# Patient Record
Sex: FEMALE | Race: BLACK OR AFRICAN AMERICAN | NOT HISPANIC OR LATINO | Employment: FULL TIME | URBAN - METROPOLITAN AREA
[De-identification: names, ages, dates, MRNs, and addresses within clinical notes are randomized per-mention and may not be internally consistent; named-entity substitution may affect disease eponyms.]

---

## 2017-12-07 RX ORDER — ALBUTEROL SULFATE 90 UG/1
2 AEROSOL, METERED RESPIRATORY (INHALATION) EVERY 6 HOURS
COMMUNITY
End: 2018-11-16

## 2017-12-07 RX ORDER — EPINEPHRINE 0.3 MG/.3ML
0.3 INJECTION SUBCUTANEOUS PRN
COMMUNITY
End: 2018-11-16

## 2017-12-08 ENCOUNTER — ANESTHESIA (OUTPATIENT)
Dept: SURGERY | Facility: CLINIC | Age: 27
End: 2017-12-08
Payer: COMMERCIAL

## 2017-12-08 ENCOUNTER — SURGERY (OUTPATIENT)
Age: 27
End: 2017-12-08

## 2017-12-08 ENCOUNTER — ANESTHESIA EVENT (OUTPATIENT)
Dept: SURGERY | Facility: CLINIC | Age: 27
End: 2017-12-08
Payer: COMMERCIAL

## 2017-12-08 ENCOUNTER — HOSPITAL ENCOUNTER (OUTPATIENT)
Facility: CLINIC | Age: 27
Discharge: HOME | End: 2017-12-08
Attending: OPHTHALMOLOGY | Admitting: OPHTHALMOLOGY
Payer: COMMERCIAL

## 2017-12-08 VITALS
DIASTOLIC BLOOD PRESSURE: 93 MMHG | SYSTOLIC BLOOD PRESSURE: 112 MMHG | BODY MASS INDEX: 28.04 KG/M2 | OXYGEN SATURATION: 100 % | HEIGHT: 68 IN | WEIGHT: 185 LBS | RESPIRATION RATE: 16 BRPM | TEMPERATURE: 97 F

## 2017-12-08 PROCEDURE — 25000128 H RX IP 250 OP 636: Performed by: OPHTHALMOLOGY

## 2017-12-08 PROCEDURE — 36000104 ZZH EYE SURGERY LEVEL 4 1ST 30 MIN: Performed by: OPHTHALMOLOGY

## 2017-12-08 PROCEDURE — 37000009 ZZH ANESTHESIA TECHNICAL FEE, EACH ADDTL 15 MIN: Performed by: OPHTHALMOLOGY

## 2017-12-08 PROCEDURE — 25000125 ZZHC RX 250: Performed by: OPHTHALMOLOGY

## 2017-12-08 PROCEDURE — 25000128 H RX IP 250 OP 636: Performed by: ANESTHESIOLOGY

## 2017-12-08 PROCEDURE — 71000005 ZZH RECOVERY EYE PHASE 1 LEVEL 1 EA ADDTL HR: Performed by: OPHTHALMOLOGY

## 2017-12-08 PROCEDURE — 71000028 ZZH EYE RECOVERY PHASE 2 EACH 15 MINS: Performed by: OPHTHALMOLOGY

## 2017-12-08 PROCEDURE — 36000105 ZZH EYE SURGERY LEVEL 4 EA 15 ADDTL MIN: Performed by: OPHTHALMOLOGY

## 2017-12-08 PROCEDURE — 40000170 ZZH STATISTIC PRE-PROCEDURE ASSESSMENT II: Performed by: OPHTHALMOLOGY

## 2017-12-08 PROCEDURE — 37000008 ZZH ANESTHESIA TECHNICAL FEE, 1ST 30 MIN: Performed by: OPHTHALMOLOGY

## 2017-12-08 PROCEDURE — 71000004 ZZH RECOVERY EYE PHASE 1 LEVEL 1 FIRST HR: Performed by: OPHTHALMOLOGY

## 2017-12-08 PROCEDURE — V2630 ANTER CHAMBER INTRAOCUL LENS: HCPCS | Performed by: OPHTHALMOLOGY

## 2017-12-08 PROCEDURE — 27210794 ZZH OR GENERAL SUPPLY STERILE: Performed by: OPHTHALMOLOGY

## 2017-12-08 PROCEDURE — 25000125 ZZHC RX 250: Performed by: NURSE ANESTHETIST, CERTIFIED REGISTERED

## 2017-12-08 PROCEDURE — 25000128 H RX IP 250 OP 636: Performed by: NURSE ANESTHETIST, CERTIFIED REGISTERED

## 2017-12-08 DEVICE — IMPLANTABLE DEVICE: Type: IMPLANTABLE DEVICE | Site: EYE | Status: FUNCTIONAL

## 2017-12-08 RX ORDER — ONDANSETRON 2 MG/ML
4 INJECTION INTRAMUSCULAR; INTRAVENOUS EVERY 30 MIN PRN
Status: DISCONTINUED | OUTPATIENT
Start: 2017-12-08 | End: 2017-12-08 | Stop reason: HOSPADM

## 2017-12-08 RX ORDER — LIDOCAINE 40 MG/G
CREAM TOPICAL
Status: DISCONTINUED | OUTPATIENT
Start: 2017-12-08 | End: 2017-12-08 | Stop reason: HOSPADM

## 2017-12-08 RX ORDER — DEXAMETHASONE SODIUM PHOSPHATE 4 MG/ML
INJECTION, SOLUTION INTRA-ARTICULAR; INTRALESIONAL; INTRAMUSCULAR; INTRAVENOUS; SOFT TISSUE PRN
Status: DISCONTINUED | OUTPATIENT
Start: 2017-12-08 | End: 2017-12-08

## 2017-12-08 RX ORDER — ONDANSETRON 4 MG/1
4 TABLET, ORALLY DISINTEGRATING ORAL EVERY 30 MIN PRN
Status: DISCONTINUED | OUTPATIENT
Start: 2017-12-08 | End: 2017-12-08 | Stop reason: HOSPADM

## 2017-12-08 RX ORDER — PROPARACAINE HYDROCHLORIDE 5 MG/ML
1 SOLUTION/ DROPS OPHTHALMIC ONCE
Status: COMPLETED | OUTPATIENT
Start: 2017-12-08 | End: 2017-12-08

## 2017-12-08 RX ORDER — TETRACAINE HYDROCHLORIDE 5 MG/ML
SOLUTION OPHTHALMIC PRN
Status: DISCONTINUED | OUTPATIENT
Start: 2017-12-08 | End: 2017-12-08 | Stop reason: HOSPADM

## 2017-12-08 RX ORDER — LABETALOL HYDROCHLORIDE 5 MG/ML
10 INJECTION, SOLUTION INTRAVENOUS
Status: DISCONTINUED | OUTPATIENT
Start: 2017-12-08 | End: 2017-12-08 | Stop reason: HOSPADM

## 2017-12-08 RX ORDER — SODIUM CHLORIDE, SODIUM LACTATE, POTASSIUM CHLORIDE, CALCIUM CHLORIDE 600; 310; 30; 20 MG/100ML; MG/100ML; MG/100ML; MG/100ML
INJECTION, SOLUTION INTRAVENOUS CONTINUOUS
Status: DISCONTINUED | OUTPATIENT
Start: 2017-12-08 | End: 2017-12-08 | Stop reason: HOSPADM

## 2017-12-08 RX ORDER — LIDOCAINE HYDROCHLORIDE 20 MG/ML
INJECTION, SOLUTION INFILTRATION; PERINEURAL PRN
Status: DISCONTINUED | OUTPATIENT
Start: 2017-12-08 | End: 2017-12-08

## 2017-12-08 RX ORDER — ONDANSETRON 2 MG/ML
INJECTION INTRAMUSCULAR; INTRAVENOUS PRN
Status: DISCONTINUED | OUTPATIENT
Start: 2017-12-08 | End: 2017-12-08

## 2017-12-08 RX ORDER — ALBUTEROL SULFATE 0.83 MG/ML
2.5 SOLUTION RESPIRATORY (INHALATION) EVERY 4 HOURS PRN
Status: DISCONTINUED | OUTPATIENT
Start: 2017-12-08 | End: 2017-12-08 | Stop reason: HOSPADM

## 2017-12-08 RX ORDER — PHENYLEPHRINE HYDROCHLORIDE 25 MG/ML
1 SOLUTION/ DROPS OPHTHALMIC
Status: COMPLETED | OUTPATIENT
Start: 2017-12-08 | End: 2017-12-08

## 2017-12-08 RX ORDER — HYDRALAZINE HYDROCHLORIDE 20 MG/ML
2.5-5 INJECTION INTRAMUSCULAR; INTRAVENOUS EVERY 10 MIN PRN
Status: DISCONTINUED | OUTPATIENT
Start: 2017-12-08 | End: 2017-12-08 | Stop reason: HOSPADM

## 2017-12-08 RX ORDER — FENTANYL CITRATE 50 UG/ML
25-50 INJECTION, SOLUTION INTRAMUSCULAR; INTRAVENOUS
Status: DISCONTINUED | OUTPATIENT
Start: 2017-12-08 | End: 2017-12-08 | Stop reason: HOSPADM

## 2017-12-08 RX ORDER — HYDROMORPHONE HYDROCHLORIDE 1 MG/ML
.3-.5 INJECTION, SOLUTION INTRAMUSCULAR; INTRAVENOUS; SUBCUTANEOUS EVERY 10 MIN PRN
Status: DISCONTINUED | OUTPATIENT
Start: 2017-12-08 | End: 2017-12-08 | Stop reason: HOSPADM

## 2017-12-08 RX ORDER — NALOXONE HYDROCHLORIDE 0.4 MG/ML
.1-.4 INJECTION, SOLUTION INTRAMUSCULAR; INTRAVENOUS; SUBCUTANEOUS
Status: DISCONTINUED | OUTPATIENT
Start: 2017-12-08 | End: 2017-12-08 | Stop reason: HOSPADM

## 2017-12-08 RX ORDER — TROPICAMIDE 10 MG/ML
1 SOLUTION/ DROPS OPHTHALMIC
Status: COMPLETED | OUTPATIENT
Start: 2017-12-08 | End: 2017-12-08

## 2017-12-08 RX ORDER — MEPERIDINE HYDROCHLORIDE 25 MG/ML
12.5 INJECTION INTRAMUSCULAR; INTRAVENOUS; SUBCUTANEOUS
Status: DISCONTINUED | OUTPATIENT
Start: 2017-12-08 | End: 2017-12-08 | Stop reason: HOSPADM

## 2017-12-08 RX ORDER — PROPOFOL 10 MG/ML
INJECTION, EMULSION INTRAVENOUS PRN
Status: DISCONTINUED | OUTPATIENT
Start: 2017-12-08 | End: 2017-12-08

## 2017-12-08 RX ORDER — PROPOFOL 10 MG/ML
INJECTION, EMULSION INTRAVENOUS CONTINUOUS PRN
Status: DISCONTINUED | OUTPATIENT
Start: 2017-12-08 | End: 2017-12-08

## 2017-12-08 RX ORDER — MOXIFLOXACIN 5 MG/ML
1 SOLUTION/ DROPS OPHTHALMIC
Status: COMPLETED | OUTPATIENT
Start: 2017-12-08 | End: 2017-12-08

## 2017-12-08 RX ORDER — DICLOFENAC SODIUM 1 MG/ML
1 SOLUTION/ DROPS OPHTHALMIC
Status: COMPLETED | OUTPATIENT
Start: 2017-12-08 | End: 2017-12-08

## 2017-12-08 RX ORDER — LIDOCAINE HYDROCHLORIDE 10 MG/ML
INJECTION, SOLUTION EPIDURAL; INFILTRATION; INTRACAUDAL; PERINEURAL PRN
Status: DISCONTINUED | OUTPATIENT
Start: 2017-12-08 | End: 2017-12-08 | Stop reason: HOSPADM

## 2017-12-08 RX ORDER — FENTANYL CITRATE 50 UG/ML
INJECTION, SOLUTION INTRAMUSCULAR; INTRAVENOUS PRN
Status: DISCONTINUED | OUTPATIENT
Start: 2017-12-08 | End: 2017-12-08

## 2017-12-08 RX ORDER — BALANCED SALT SOLUTION 6.4; .75; .48; .3; 3.9; 1.7 MG/ML; MG/ML; MG/ML; MG/ML; MG/ML; MG/ML
SOLUTION OPHTHALMIC PRN
Status: DISCONTINUED | OUTPATIENT
Start: 2017-12-08 | End: 2017-12-08 | Stop reason: HOSPADM

## 2017-12-08 RX ADMIN — LIDOCAINE HYDROCHLORIDE 1 ML: 10 INJECTION, SOLUTION EPIDURAL; INFILTRATION; INTRACAUDAL; PERINEURAL at 14:07

## 2017-12-08 RX ADMIN — FENTANYL CITRATE 50 MCG: 50 INJECTION, SOLUTION INTRAMUSCULAR; INTRAVENOUS at 13:33

## 2017-12-08 RX ADMIN — DEXMEDETOMIDINE HYDROCHLORIDE 12 MCG: 100 INJECTION, SOLUTION INTRAVENOUS at 14:25

## 2017-12-08 RX ADMIN — FENTANYL CITRATE 25 MCG: 50 INJECTION, SOLUTION INTRAMUSCULAR; INTRAVENOUS at 14:03

## 2017-12-08 RX ADMIN — MIDAZOLAM 2 MG: 1 INJECTION INTRAMUSCULAR; INTRAVENOUS at 13:30

## 2017-12-08 RX ADMIN — PROPOFOL 200 MCG/KG/MIN: 10 INJECTION, EMULSION INTRAVENOUS at 13:34

## 2017-12-08 RX ADMIN — LIDOCAINE HYDROCHLORIDE 80 MG: 20 INJECTION, SOLUTION INFILTRATION; PERINEURAL at 13:34

## 2017-12-08 RX ADMIN — PROPARACAINE HYDROCHLORIDE 1 DROP: 5 SOLUTION/ DROPS OPHTHALMIC at 12:39

## 2017-12-08 RX ADMIN — PROPOFOL: 10 INJECTION, EMULSION INTRAVENOUS at 14:09

## 2017-12-08 RX ADMIN — TROPICAMIDE 1 DROP: 10 SOLUTION/ DROPS OPHTHALMIC at 12:39

## 2017-12-08 RX ADMIN — SODIUM CHLORIDE, POTASSIUM CHLORIDE, SODIUM LACTATE AND CALCIUM CHLORIDE: 600; 310; 30; 20 INJECTION, SOLUTION INTRAVENOUS at 13:00

## 2017-12-08 RX ADMIN — DEXAMETHASONE SODIUM PHOSPHATE 4 MG: 4 INJECTION, SOLUTION INTRA-ARTICULAR; INTRALESIONAL; INTRAMUSCULAR; INTRAVENOUS; SOFT TISSUE at 13:47

## 2017-12-08 RX ADMIN — TROPICAMIDE 1 DROP: 10 SOLUTION/ DROPS OPHTHALMIC at 12:50

## 2017-12-08 RX ADMIN — CARBACHOL 0.5 ML: 0.1 SOLUTION OPHTHALMIC at 14:09

## 2017-12-08 RX ADMIN — PROPOFOL 200 MG: 10 INJECTION, EMULSION INTRAVENOUS at 13:34

## 2017-12-08 RX ADMIN — DEXMEDETOMIDINE HYDROCHLORIDE 8 MCG: 100 INJECTION, SOLUTION INTRAVENOUS at 14:31

## 2017-12-08 RX ADMIN — ONDANSETRON 4 MG: 2 INJECTION INTRAMUSCULAR; INTRAVENOUS at 14:24

## 2017-12-08 RX ADMIN — ACETYLCHOLINE CHLORIDE 5 MG: KIT at 14:12

## 2017-12-08 RX ADMIN — SODIUM CHLORIDE, POTASSIUM CHLORIDE, SODIUM LACTATE AND CALCIUM CHLORIDE: 600; 310; 30; 20 INJECTION, SOLUTION INTRAVENOUS at 14:33

## 2017-12-08 RX ADMIN — PHENYLEPHRINE HYDROCHLORIDE 1 DROP: 2.5 SOLUTION/ DROPS OPHTHALMIC at 12:50

## 2017-12-08 RX ADMIN — EPINEPHRINE 500 ML: 1 INJECTION, SOLUTION, CONCENTRATE INTRAVENOUS at 14:07

## 2017-12-08 RX ADMIN — DICLOFENAC SODIUM 1 DROP: 1 SOLUTION OPHTHALMIC at 13:00

## 2017-12-08 RX ADMIN — ACETYLCHOLINE CHLORIDE 5 MG: KIT at 14:06

## 2017-12-08 RX ADMIN — DICLOFENAC SODIUM 1 DROP: 1 SOLUTION OPHTHALMIC at 12:50

## 2017-12-08 RX ADMIN — DICLOFENAC SODIUM 1 DROP: 1 SOLUTION OPHTHALMIC at 12:39

## 2017-12-08 RX ADMIN — MOXIFLOXACIN 1 DROP: 5 SOLUTION/ DROPS OPHTHALMIC at 12:39

## 2017-12-08 RX ADMIN — BALANCED SALT SOLUTION 15 ML: 6.4; .75; .48; .3; 3.9; 1.7 SOLUTION OPHTHALMIC at 14:06

## 2017-12-08 RX ADMIN — TROPICAMIDE 1 DROP: 10 SOLUTION/ DROPS OPHTHALMIC at 12:59

## 2017-12-08 RX ADMIN — FENTANYL CITRATE 25 MCG: 50 INJECTION, SOLUTION INTRAMUSCULAR; INTRAVENOUS at 14:18

## 2017-12-08 RX ADMIN — PROPOFOL: 10 INJECTION, EMULSION INTRAVENOUS at 14:27

## 2017-12-08 RX ADMIN — Medication 1 ML: at 14:08

## 2017-12-08 RX ADMIN — Medication 0.8 ML: at 14:15

## 2017-12-08 RX ADMIN — PHENYLEPHRINE HYDROCHLORIDE 1 DROP: 2.5 SOLUTION/ DROPS OPHTHALMIC at 12:39

## 2017-12-08 RX ADMIN — PHENYLEPHRINE HYDROCHLORIDE 1 DROP: 2.5 SOLUTION/ DROPS OPHTHALMIC at 13:00

## 2017-12-08 RX ADMIN — TETRACAINE HYDROCHLORIDE 2 DROP: 5 SOLUTION OPHTHALMIC at 14:08

## 2017-12-08 RX ADMIN — MOXIFLOXACIN 1 DROP: 5 SOLUTION/ DROPS OPHTHALMIC at 12:59

## 2017-12-08 RX ADMIN — MOXIFLOXACIN 1 DROP: 5 SOLUTION/ DROPS OPHTHALMIC at 12:50

## 2017-12-08 ASSESSMENT — COPD QUESTIONNAIRES: COPD: 0

## 2017-12-08 ASSESSMENT — ENCOUNTER SYMPTOMS
SEIZURES: 0
DYSRHYTHMIAS: 0

## 2017-12-08 NOTE — OR NURSING
Pt arrived in Phase II, took off her BP cuff and wanted to get up to BR immediately. Assisted to BR. Began getting dressed when getting back to bay 10. Boyfriend, their children, and grandmother came to Phase II. Discharge instructions reviewed and pt has her drops. Denies pain.

## 2017-12-08 NOTE — ANESTHESIA CARE TRANSFER NOTE
Patient: Ernesto Valencia    Procedure(s):  RIGHT EYE  LENSECTOMY, ANTERIOR VITRECTOMY AND ANTERIOR CHAMBER INTRAOCULAR LENS IMPLANT - Wound Class: I-Clean   - Wound Class: I-Clean    Diagnosis: MARFANS SYNDROME WITH SUBLUXATION OF EYE   Diagnosis Additional Information: No value filed.    Anesthesia Type:   General, LMA     Note:  Airway :Face Mask  Patient transferred to:PACU  Handoff Report: Identifed the Patient, Identified the Reponsible Provider, Reviewed the pertinent medical history, Discussed the surgical course, Reviewed Intra-OP anesthesia mangement and issues during anesthesia, Set expectations for post-procedure period and Allowed opportunity for questions and acknowledgement of understanding    At end of procedure, spontaneous respirations, adequate tidal volumes, LMA removed atraumatically, airway patent after LMA removal. Oxygen tubing connected to wall O2 in PACU, SpO2, NiBP, and EKG monitors and alarms on and functioning, report on patient's clinical status given to PACU RN, RN questions answered.Transferred to Main PACU cart with armrests up, spontaneous respirations, O2 saturation maintained greater than 95% with oxygen via facemask.    Electronically Signed By: MARY Macdonald CRNA  December 8, 2017  2:47 PM

## 2017-12-08 NOTE — IP AVS SNAPSHOT
MRN:[id number]                      After Visit Summary   12/8/2017    Ernesto Valencia    MRN: [id number]           Thank you!     Thank you for choosing [facility] for your care. Our goal is always to provide you with excellent care. Hearing back from our patients is one way we can continue to improve our services. Please take a few minutes to complete the written survey that you may receive in the mail after you visit with us. Thank you!        Patient Information     Date Of Birth          1990        About your hospital stay     You were admitted on:  December 8, 2017 You last received care in the:  [facility]    You were discharged on:  December 8, 2017       Who to Call     For medical emergencies, please call 911.  For non-urgent questions about your medical care, please call your primary care provider or clinic, None  For questions related to your surgery, please call your surgery clinic        Attending Provider     Provider Andrew Ibarra MD Ophthalmology       Primary Care Provider Fax #    Physician No Ref-Primary [phone]      Your next 10 appointments already scheduled     Dec 21, 2017   Procedure with Andrew Martinez MD   [facility] (--)    [address] [place] [address] [address], [address]  [facility] [address] [phone]              Further instructions from your care team       Cataract Surgery Postoperative Instructions  Dr. Andrew Martinez      Postoperative Medications: After surgery, you will use eye drop medications. In most cases, you will start these eye drops 2 days before the day of surgery.   Follow the directions provided to you from the pharmacy or from the doctor's office.    The drops might sting a little when they are instilled, and that is normal.    It doesn't matter what order you put the drops into your eyes, but you should wait at least one minute between drops.    Recently we started using a  "compounded drop that contains all three prescriptions in one bottle. If you have this drop you only need to use one drop 4 time per day. Many people choose to do the drops at breakfast, lunch, dinner, and bedtime.    Artificial Tears- Are lubricating drops used to moisturize the eye.  You can use these as much as you want.  Particularly if your eyes feel watery, gritty, or uncomfortable.  Chilling these drops in the refrigerator results in a more soothing feeling.  There are several brands of artificial tears available including, but not limited to, Optive, Refresh, Systane, Blink, Genteal, Soothe, and others.  You should not use drops that are \"gets the red out.\"  You do not need a prescription for these medications.     Please continue any glaucoma, dry eye, or other medications you were using prior to the surgery.      Please allow 24 to 48 hours when requesting refills, and call BEFORE you run out of drops.    Restriction on Activities-  It is extremely important that you DO NOT RUB THE TREATED EYE.    - You will be given a clear plastic shield to wear as protection over your eye the night after surgery.    - Refrain from many activities that may put your eye at risk of injury, as well as areas containing a high volume of chemicals, dust, and debris.    - Do Not wear any eye makeup or moisturizer around the eye for 1 week after surgery.    - Do Not swim or go into a hot-tub, jacuzzi, or sauna for 1 week following surgery.  You can take showers as normal, but avoid getting shampoo or soap into your eyes.     - Avoid strenuous activity, including lifting more than 10 pounds, for 1 week after surgery.       - It is fine to bathe, read, watch TV, and use the computer.    Symptoms requiring medical attention:     - Sudden onset of increased discharge from the eye.  - Persistent or increasing pain in the eye.  - Sudden decrease in vision.  - Persistent nausea or vomiting.      If you have any questions or concerns " "before or after your surgery, please contact Dr. Martinez's office at [phone].         [facility] Discharge  Instructions  Anesthesia (Eye Care Center)   Adult Discharge Instructions (General)    For 24 hours after surgery    1. Get plenty of rest.  Make arrangements to have a responsible adult stay with you for at least 24 hours.  2. Do not drive or use heavy equipment for 24 hours.    3. Do not drink alcohol for 24 hours.  4. Do not sign legal documents or make important decisions for 24 hours.  5. Avoid strenuous or risky activities. You may feel lightheaded.  If so, sit for a few minutes before standing.  Have someone help you get up.   6. General anesthesia patients should drink only clear liquids (apple juice, ginger ale, broth or 7-Up). Be sure to drink enough fluids.  Move to a regular diet as you feel able.  7. Any questions of medical nature, call your physician.          Pending Results     No orders found from 12/6/2017 to 12/9/2017.            Admission Information     Date & Time Provider Department Dept. Phone    12/8/2017 Andrew Martinez MD [facility] [phone]      Your Vitals Were     Blood Pressure Temperature Respirations Height Weight Last Period    112/93 97  F (36.1  C) (Temporal) 16 1.727 m (5' 8\") 83.9 kg (185 lb) 11/17/2017    Pulse Oximetry BMI (Body Mass Index)                100% 28.13 kg/m2          MyChart Information     [employer] lets you send messages to your doctor, view your test results, renew your prescriptions, schedule appointments and more. To sign up, go to [email or web address] . Click on \"Log in\" on the left side of the screen, which will take you to the Welcome page. Then click on \"Sign up Now\" on the right side of the page.     You will be asked to enter the access code listed below, as well as some personal information. Please follow the directions to create your username and password.     Your " access code is: [email or web address]  Expires: 3/8/2018  4:14 PM     Your access code will  in 90 days. If you need help or a new code, please call your [place] clinic or [phone].        Care EveryWhere ID     This is your Care EveryWhere ID. This could be used by other organizations to access your [place] medical records  [id number]        Equal Access to Services     RADHA GRAYSON : Hadii aad ku hadasho Soomaali, waaxda luqadaha, qaybta kaalmada adeegyada, waxzainab miguelin kann adelyric sequeira lafloydelly . So [facility] [phone].    ATENCIÓN: Si habla español, tiene a chahal disposición servicios gratuitos de asistencia lingüística. Pepitoame al [phone].    We comply with applicable federal civil rights laws and [place] laws. We do not discriminate on the basis of race, color, national origin, age, disability, sex, sexual orientation, or gender identity.               Review of your medicines      UNREVIEWED medicines. Ask your doctor about these medicines        Dose / Directions    ACETAMINOPHEN PO        Dose:  325 mg   Take 325 mg by mouth every 4 hours as needed for pain   Refills:  0       albuterol 108 (90 BASE) MCG/ACT Inhaler   Commonly known as:  PROAIR HFA/PROVENTIL HFA/VENTOLIN HFA        Dose:  2 puff   Inhale 2 puffs into the lungs every 6 hours   Refills:  0       EPINEPHrine 0.3 MG/0.3ML injection 2-pack   Commonly known as:  EPIPEN/ADRENACLICK/or ANY BX GENERIC EQUIV        Dose:  0.3 mg   Inject 0.3 mg into the muscle as needed for anaphylaxis   Refills:  0       MOTRIN IB PO        Dose:  600 mg   Take 600 mg by mouth 3 times daily   Refills:  0                Protect others around you: Learn how to safely use, store and throw away your medicines at www.disposemymeds.org.             Medication List: This is a list of all your medications and when to take them. Check marks below indicate your daily home schedule. Keep this list as a reference.      Medications           Morning Afternoon Evening  Bedtime As Needed    ACETAMINOPHEN PO   Take 325 mg by mouth every 4 hours as needed for pain                                albuterol 108 (90 BASE) MCG/ACT Inhaler   Commonly known as:  PROAIR HFA/PROVENTIL HFA/VENTOLIN HFA   Inhale 2 puffs into the lungs every 6 hours                                EPINEPHrine 0.3 MG/0.3ML injection 2-pack   Commonly known as:  EPIPEN/ADRENACLICK/or ANY BX GENERIC EQUIV   Inject 0.3 mg into the muscle as needed for anaphylaxis                                MOTRIN IB PO   Take 600 mg by mouth 3 times daily

## 2017-12-08 NOTE — DISCHARGE INSTRUCTIONS
"Cataract Surgery Postoperative Instructions  Dr. Andrew Martinez      Postoperative Medications: After surgery, you will use eye drop medications. In most cases, you will start these eye drops 2 days before the day of surgery.   Follow the directions provided to you from the pharmacy or from the doctor's office.    The drops might sting a little when they are instilled, and that is normal.    It doesn't matter what order you put the drops into your eyes, but you should wait at least one minute between drops.    Recently we started using a compounded drop that contains all three prescriptions in one bottle. If you have this drop you only need to use one drop 4 time per day. Many people choose to do the drops at breakfast, lunch, dinner, and bedtime.    Artificial Tears- Are lubricating drops used to moisturize the eye.  You can use these as much as you want.  Particularly if your eyes feel watery, gritty, or uncomfortable.  Chilling these drops in the refrigerator results in a more soothing feeling.  There are several brands of artificial tears available including, but not limited to, Optive, Refresh, Systane, Blink, Genteal, Soothe, and others.  You should not use drops that are \"gets the red out.\"  You do not need a prescription for these medications.     Please continue any glaucoma, dry eye, or other medications you were using prior to the surgery.      Please allow 24 to 48 hours when requesting refills, and call BEFORE you run out of drops.    Restriction on Activities-  It is extremely important that you DO NOT RUB THE TREATED EYE.    - You will be given a clear plastic shield to wear as protection over your eye the night after surgery.    - Refrain from many activities that may put your eye at risk of injury, as well as areas containing a high volume of chemicals, dust, and debris.    - Do Not wear any eye makeup or moisturizer around the eye for 1 week after surgery.    - Do Not swim or go into a hot-tub, " jacuzzi, or sauna for 1 week following surgery.  You can take showers as normal, but avoid getting shampoo or soap into your eyes.     - Avoid strenuous activity, including lifting more than 10 pounds, for 1 week after surgery.       - It is fine to bathe, read, watch TV, and use the computer.    Symptoms requiring medical attention:     - Sudden onset of increased discharge from the eye.  - Persistent or increasing pain in the eye.  - Sudden decrease in vision.  - Persistent nausea or vomiting.      If you have any questions or concerns before or after your surgery, please contact Dr. Martinez's office at [phone].         [facility] Anesthesia [facility] Care [facility] Discharge  Instructions  Anesthesia (Eye Care Center)   Adult Discharge Instructions (General)    For 24 hours after surgery    1. Get plenty of rest.  Make arrangements to have a responsible adult stay with you for at least 24 hours.  2. Do not drive or use heavy equipment for 24 hours.    3. Do not drink alcohol for 24 hours.  4. Do not sign legal documents or make important decisions for 24 hours.  5. Avoid strenuous or risky activities. You may feel lightheaded.  If so, sit for a few minutes before standing.  Have someone help you get up.   6. General anesthesia patients should drink only clear liquids (apple juice, ginger ale, broth or 7-Up). Be sure to drink enough fluids.  Move to a regular diet as you feel able.  7. Any questions of medical nature, call your physician.

## 2017-12-08 NOTE — OR NURSING
Telephone report given to [facility] RN.  Patient will be transported to Room. 10 via cart accompanied by ILIA.

## 2017-12-08 NOTE — ANESTHESIA PREPROCEDURE EVALUATION
Procedure: Procedure(s):  VITRECTOMY ANTERIOR WITH INTRAOCULAR LENS IMPLANT  IMPLANT SECONDARY INTRAOCULAR LENS IMPLANT  Preop diagnosis: MARFANS SYNDROME WITH SUBLUXATION OF EYE     Allergies   Allergen Reactions     Seafood      Past Medical History:   Diagnosis Date     Fetal alcohol spectrum disorder      Marfan syndrome      Scoliosis      Subluxation of lens      Uncomplicated asthma      No past surgical history on file.  Prior to Admission medications    Medication Sig Start Date End Date Taking? Authorizing Provider   albuterol (PROAIR HFA/PROVENTIL HFA/VENTOLIN HFA) 108 (90 BASE) MCG/ACT Inhaler Inhale 2 puffs into the lungs every 6 hours   Yes Reported, Patient   ACETAMINOPHEN PO Take 325 mg by mouth every 4 hours as needed for pain   Yes Reported, Patient   MOTRIN IB PO Take 600 mg by mouth 3 times daily   Yes Reported, Patient   EPINEPHrine (EPIPEN/ADRENACLICK/OR ANY BX GENERIC EQUIV) 0.3 MG/0.3ML injection 2-pack Inject 0.3 mg into the muscle as needed for anaphylaxis   Yes Reported, Patient     ECG: NSR, no st or twave abnormalities.     Anesthesia Evaluation     .             ROS/MED HX    ENT/Pulmonary:     (+)asthma , . .   (-) COPD and sleep apnea   Neurologic:      (-) seizures, CVA and migraines   Cardiovascular:        (-) hypertension, CAD, PERALES, arrhythmias and dyslipidemia   METS/Exercise Tolerance:  >4 METS   Hematologic:        (-) history of blood clots, anemia and other hematologic disorder   Musculoskeletal:   (+) , , other musculoskeletal- Scoliosis      GI/Hepatic:        (-) GERD and liver disease   Renal/Genitourinary:      (-) renal disease and nephrolithiasis   Endo:      (-) Type I DM, Type II DM and thyroid disease   Psychiatric:         Infectious Disease:        (-) Recent Fever   Malignancy:         Other: Comment: Fetal alcohol disorder  Marfan syndrome  Subluxation of lens                    Physical Exam  Normal systems: cardiovascular, pulmonary and dental    Airway    Mallampati: II  TM distance: >3 FB  Neck ROM: full    Dental     Cardiovascular   Rhythm and rate: regular and normal  (-) no murmur    Pulmonary    breath sounds clear to auscultation                    Anesthesia Plan      History & Physical Review      ASA Status:  2 .    NPO Status:  > 8 hours    Plan for General and LMA with Propofol induction. Maintenance will be TIVA.    PONV prophylaxis:  Ondansetron (or other 5HT-3)  Propofol infusion      Postoperative Care  Postoperative pain management:  Multi-modal analgesia.      Consents  Anesthetic plan, risks, benefits and alternatives discussed with:  Patient..                          .

## 2017-12-08 NOTE — IP AVS SNAPSHOT
[facility]    [address]    [place] [address]    Phone:  [phone]    Fax:  [phone]                                       After Visit Summary   12/8/2017    Ernesto Valencia    MRN: [id number]           After Visit Summary Signature Page     I have received my discharge instructions, and my questions have been answered. I have discussed any challenges I see with this plan with the nurse or doctor.    ..........................................................................................................................................  Patient/Patient Representative Signature      ..........................................................................................................................................  Patient Representative Print Name and Relationship to Patient    ..................................................               ................................................  Date                                            Time    ..........................................................................................................................................  Reviewed by Signature/Title    ...................................................              ..............................................  Date                                                            Time

## 2017-12-08 NOTE — OP NOTE
SURGEON:  Andrew Martinez MD.     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    POSTOPERATIVE DIAGNOSIS:  Subluxated intraocular lens secondary to Marfan's syndrome.    PROCEDURE: Aspiration of lens material with insertion of an anterior chamber intraocular lens.     INDICATIONS:  Ernesto Valencia is an 19-year-old female with decreased vision and diplopia secondary to lens subluxation as a result of Marfan's syndrome.       ANESTHESIA:  General.     COMPLICATIONS:  None.     PROCEDURE:  Following induction of general anesthesia, the patient's eye was prepped and draped in the usual sterile fashion.  A super sharp blade was used to create a paracentesis, and Viscoat was injected over the area of compromised zonules to keep vitreous posterior.  Additional Viscoat was then used to inflate the anterior chamber.  A 2.8 mm keratome was used to create a clear corneal incision.  Using a cystotome and Utrata forceps, a continuous curved linear capsulorrhexis was performed. Capsule hooks were used to stabilize the capsular bag. Using aspiration, the nuclear lens material was removed from the capsular bag in addition to cortex.  Anterior vitrectomy was performed.  Miochol and Miostat were used to bring the pupil down.  A surgical peripheral iridotomy was made with the vitrector.  The anterior chamber was filed with Amvisc.  The wound was enlarged and a sheets glide was placed.  A 18.5 D [id number] lens was placed in the anterior chamber. Three interrupted 10-0 nylon sutures were used to secure the main incision.  The wounds were hydrated with BSS Plus, and the wounds were noted to be water tight.  The patient left the operating suite in stable condition.     Andrew Martinez MD

## 2017-12-26 NOTE — ANESTHESIA POSTPROCEDURE EVALUATION
Patient: Ernesto Valencia    Procedure(s):  RIGHT EYE  LENSECTOMY, ANTERIOR VITRECTOMY AND ANTERIOR CHAMBER INTRAOCULAR LENS IMPLANT - Wound Class: I-Clean   - Wound Class: I-Clean    Diagnosis:MARFANS SYNDROME WITH SUBLUXATION OF EYE   Diagnosis Additional Information: No value filed.    Anesthesia Type:  General, LMA    Note:  Anesthesia Post Evaluation    Patient location during evaluation: Phase 2  Patient participation: Able to fully participate in evaluation  Level of consciousness: awake and alert  Pain management: adequate  Airway patency: patent  Cardiovascular status: acceptable  Respiratory status: acceptable  Hydration status: acceptable  PONV: none     Anesthetic complications: None          Last vitals:  Vitals:    12/08/17 1540 12/08/17 1550 12/08/17 1600   BP: 121/69  (!) 112/93   Resp: 13 13 16   Temp: 36.1  C (97  F)     SpO2: 99% 99% 100%         Electronically Signed By: Dasha Fregoso MD  December 26, 2017  11:51 AM

## 2018-06-28 ENCOUNTER — HOSPITAL ENCOUNTER (EMERGENCY)
Facility: CLINIC | Age: 28
Discharge: HOME | End: 2018-06-28
Attending: EMERGENCY MEDICINE | Admitting: EMERGENCY MEDICINE
Payer: COMMERCIAL

## 2018-06-28 VITALS
TEMPERATURE: 98.2 F | SYSTOLIC BLOOD PRESSURE: 124 MMHG | WEIGHT: 170 LBS | DIASTOLIC BLOOD PRESSURE: 68 MMHG | HEIGHT: 70 IN | HEART RATE: 79 BPM | RESPIRATION RATE: 16 BRPM | OXYGEN SATURATION: 97 % | BODY MASS INDEX: 24.34 KG/M2

## 2018-06-28 DIAGNOSIS — H10.32 ACUTE CONJUNCTIVITIS OF LEFT EYE, UNSPECIFIED ACUTE CONJUNCTIVITIS TYPE: ICD-10-CM

## 2018-06-28 PROCEDURE — 99283 EMERGENCY DEPT VISIT LOW MDM: CPT

## 2018-06-28 PROCEDURE — 25000125 ZZHC RX 250

## 2018-06-28 RX ORDER — TOBRAMYCIN 3 MG/ML
1 SOLUTION/ DROPS OPHTHALMIC EVERY 4 HOURS
Qty: 1 BOTTLE | Refills: 0 | Status: SHIPPED | OUTPATIENT
Start: 2018-06-28 | End: 2018-07-05

## 2018-06-28 RX ORDER — PROPARACAINE HYDROCHLORIDE 5 MG/ML
SOLUTION/ DROPS OPHTHALMIC
Status: COMPLETED
Start: 2018-06-28 | End: 2018-06-28

## 2018-06-28 RX ADMIN — PROPARACAINE HYDROCHLORIDE: 5 SOLUTION/ DROPS OPHTHALMIC at 15:10

## 2018-06-28 ASSESSMENT — ENCOUNTER SYMPTOMS
FEVER: 0
EYE DISCHARGE: 1
EYE REDNESS: 1
EYE ITCHING: 1
EYE PAIN: 1
PHOTOPHOBIA: 0

## 2018-06-28 NOTE — ED AVS SNAPSHOT
Emergency Department    [address] [place] [address]    Phone:  [phone]    Fax:  [phone]                                       Ernesto Valencia   MRN: [id number]    Department:   Emergency Department   Date of Visit:  6/28/2018           Patient Information     Date Of Birth          1990        Your diagnoses for this visit were:     Acute conjunctivitis of left eye, unspecified acute conjunctivitis type        You were seen by Michael Beard MD.      Follow-up Information     Follow up with your eye doctor .    Why:  As needed        Discharge Instructions         Conjunctivitis, Nonspecific    The membrane that covers the white part of your eye (the conjunctiva) is inflamed. Inflammation happens when your body responds to an injury, allergic reaction, infection, or illness. Symptoms of inflammation in the eye may include redness, irritation, itching, swelling, or burning. These symptoms should go away within the next 24 hours. Conjunctivitis may be related to a particle that was in your eye. If so, it may wash out with your tears or irrigation treatment. Being exposed to liquid chemicals or fumes may also cause this reaction.   Home care    Apply a cold pack over the eye for 20 minutes at a time. This will reduce pain. To make a cold pack, put ice cubes in a plastic bag that seals at the top. Wrap the bag in a clean, thin towel or cloth.    Artificial tears may be prescribed to reduce irritation or redness.  These should be used 3 to 4 times a day.    You may use acetaminophen or ibuprofen to control pain, unless another medicine was prescribed. (Note: If you have chronic liver or kidney disease, or if you have ever had a stomach ulcer or gastrointestinal bleeding, talk with your healthcare provider before using these medicines.)  Follow-up care  Follow up with your healthcare provider, or as advised.  When to seek medical advice  Call your healthcare provider right away  if any of these occur:    Increased eyelid swelling    Increased eye pain    Increased redness or drainage from the eye    Increased blurry vision or increased sensitivity to light    Failure of normal vision to return within 24 to 48 hours  Date Last Reviewed: 7/1/2017 2000-2017 [employer]. [address] [place] [address]. All rights reserved. This information is not intended as a substitute for professional medical care. Always follow your healthcare professional's instructions.          24 Hour Appointment Hotline       To make an appointment at any [facility], call [phone]). If you don't have a family doctor or clinic, we will help you find one. [facility] clinics are conveniently located to serve the needs of you and your family.             Review of your medicines      START taking        Dose / Directions Last dose taken    tobramycin 0.3 % ophthalmic solution   Commonly known as:  TOBREX   Dose:  1 drop   Quantity:  1 Bottle        Apply 1 drop to eye every 4 hours for 7 days   Refills:  0          Our records show that you are taking the medicines listed below. If these are incorrect, please call your family doctor or clinic.        Dose / Directions Last dose taken    ACETAMINOPHEN PO   Dose:  325 mg        Take 325 mg by mouth every 4 hours as needed for pain   Refills:  0        albuterol 108 (90 Base) MCG/ACT Inhaler   Commonly known as:  PROAIR HFA/PROVENTIL HFA/VENTOLIN HFA   Dose:  2 puff        Inhale 2 puffs into the lungs every 6 hours   Refills:  0        EPINEPHrine 0.3 MG/0.3ML injection 2-pack   Commonly known as:  EPIPEN/ADRENACLICK/or ANY BX GENERIC EQUIV   Dose:  0.3 mg        Inject 0.3 mg into the muscle as needed for anaphylaxis   Refills:  0        METOPROLOL TARTRATE PO        Refills:  0        MOTRIN IB PO   Dose:  600 mg        Take 600 mg by mouth 3 times daily   Refills:  0                Prescriptions were sent or printed at  these locations (1 Prescription)                   Other Prescriptions                Printed at Department/Unit printer (1 of 1)         tobramycin (TOBREX) 0.3 % ophthalmic solution                Orders Needing Specimen Collection     None      Pending Results     No orders found from 6/26/2018 to 6/29/2018.            Pending Culture Results     No orders found from 6/26/2018 to 6/29/2018.            Pending Results Instructions     If you had any lab results that were not finalized at the time of your Discharge, you can call the ED Lab Result RN at [phone]. You will be contacted by this team for any positive Lab results or changes in treatment. The nurses are available 7 days a week from 10A to 6:30P.  You can leave a message 24 hours per day and they will return your call.        Test Results From Your Hospital Stay               Clinical Quality Measure: Blood Pressure Screening     Your blood pressure was checked while you were in the emergency department today. The last reading we obtained was  BP: 124/68 . Please read the guidelines below about what these numbers mean and what you should do about them.  If your systolic blood pressure (the top number) is less than 120 and your diastolic blood pressure (the bottom number) is less than 80, then your blood pressure is normal. There is nothing more that you need to do about it.  If your systolic blood pressure (the top number) is 120-139 or your diastolic blood pressure (the bottom number) is 80-89, your blood pressure may be higher than it should be. You should have your blood pressure rechecked within a year by a primary care provider.  If your systolic blood pressure (the top number) is 140 or greater or your diastolic blood pressure (the bottom number) is 90 or greater, you may have high blood pressure. High blood pressure is treatable, but if left untreated over time it can put you at risk for heart attack, stroke, or kidney failure. You should have  "your blood pressure rechecked by a primary care provider within the next 4 weeks.  If your provider in the emergency department today gave you specific instructions to follow-up with your doctor or provider even sooner than that, you should follow that instruction and not wait for up to 4 weeks for your follow-up visit.        Thank you for choosing [facility]       Thank you for choosing [facility] for your care. Our goal is always to provide you with excellent care. Hearing back from our patients is one way we can continue to improve our services. Please take a few minutes to complete the written survey that you may receive in the mail after you visit with us. Thank you!        [email or web address] Information     [employer] lets you send messages to your doctor, view your test results, renew your prescriptions, schedule appointments and more. To sign up, go to [email or web address] . Click on \"Log in\" on the left side of the screen, which will take you to the Welcome page. Then click on \"Sign up Now\" on the right side of the page.     You will be asked to enter the access code listed below, as well as some personal information. Please follow the directions to create your username and password.     Your access code is: [email or web address]  Expires: 2018  3:07 PM     Your access code will  in 90 days. If you need help or a new code, please call your [facility] clinic or [phone].        Care EveryWhere ID     This is your Care EveryWhere ID. This could be used by other organizations to access your [facility] medical records  [id number]        Equal Access to Services     RADHA GRAYSON : Hadii aad ku hadasho Soomaali, waaxda luqadaha, qaybta kaalmada adeegyada, sofy narayan . So [facility] [phone].    ATENCIÓN: Si habla español, tiene a chahal disposición servicios gratuitos de asistencia lingüística. Llame al [phone].    We comply with applicable federal civil rights laws and [place] laws. We do " not discriminate on the basis of race, color, national origin, age, disability, sex, sexual orientation, or gender identity.            After Visit Summary       This is your record. Keep this with you and show to your community pharmacist(s) and doctor(s) at your next visit.

## 2018-06-28 NOTE — ED PROVIDER NOTES
"  History     Chief Complaint:  Eye Problem      HPI   Ernesto Valencia is a 27 year old female with a history of Marfan syndrome s/p cataract removal of the left eye who presents to the emergency department today for evaluation of eye problem. The patient did have a cataract surgery due to lens dislocation.  The patient reports that she woke up this morning with left eye itching, redness, crusting, pain, and mild swelling to the upper lid. The eye has been painful to the touch. She was concerned about the eye symptoms prompting her visit to the emergency department. She denies light sensitivity, blurry or double vision, and fever.     Allergies:  Seafood    Medications:    albuterol (PROAIR HFA/PROVENTIL HFA/VENTOLIN HFA) 108 (90 BASE) MCG/ACT Inhaler  METOPROLOL TARTRATE PO  EPINEPHrine (EPIPEN/ADRENACLICK/OR ANY BX GENERIC EQUIV) 0.3 MG/0.3ML injection 2-pack    Past Medical History:    Fetal alcohol spectrum disorder  Marfan syndrome  Scoliosis  Subluxation of lens  Asthma    Past Surgical History:    EYE SURGERY    IMPLANT SECONDARY INTRAOCULAR LENS IMPLANT- Right   TUBAL LIGATION    VITRECTOMY ANTERIOR WITH INTRAOCULAR LENS IMPLANT- Right    Family History:    History reviewed. No pertinent family history.     Social History:  The patient was alone.  Smoking Status: Never  Smokeless Tobacco: Never  Alcohol Use: No  Marital Status:  Single     Review of Systems   Constitutional: Negative for fever.   Eyes: Positive for pain, discharge, redness and itching. Negative for photophobia and visual disturbance.   All other systems reviewed and are negative.    Physical Exam     Patient Vitals for the past 24 hrs:   BP Temp Temp src Pulse Resp SpO2 Height Weight   06/28/18 1433 124/68 98.2  F (36.8  C) Temporal 79 16 97 % 1.768 m (5' 9.6\") 77.1 kg (170 lb)         Physical Exam  GENERAL: well developed, pleasant  HEAD: atraumatic  EYES: pupils reactive, extraocular muscles intact, conjunctivae normal  Left eye: " Conjunctiva is injected, medical aspect in more injected than lateral, purulent drainage, no foreign body.   ENT:  mucus membranes moist  NECK:  trachea midline, normal range of motion  MUSCULOSKELETAL: no deformities  SKIN: warm and dry, no acute rashes or ulceration  NEURO: GCS 15, cranial nerves intact, alert and oriented x3  PSYCH:  Mood/affect normal    Emergency Department Course   Interventions:  1447 Alcaine 0.5% ophthalmic solution     Emergency Department Course:  Nursing notes and vitals reviewed.  1444: I performed an exam of the patient as documented above.   1447: I performed an eye exam as noted above.   Findings and plan explained to the Patient. Patient discharged home with instructions regarding supportive care, medications, and reasons to return. The importance of close follow-up was reviewed. The patient was prescribed Tobramycin ophthalmic solution.   I personally answered all related questions with the patient prior to discharge.    Impression & Plan    Medical Decision Making:  Ernesto Valencia is a 27 year old female who presents with irritation and redness to the eye with drainage.  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  She notes that she did wash her hands afterward.  She notes some itching and drainage from the eye but is unsure what color it was as she washed it out, but noted that she could not open her eye.  Certainly allergic, viral, or bacterial are all considered.  There is no evidence of foreign body, corneal abrasions, or any other sinister etiologies. This is likely conjunctivitis and will start antibiotic drops.    Diagnosis:    ICD-10-CM    1. Acute conjunctivitis of left eye, unspecified acute conjunctivitis type H10.32        Disposition:  discharged to home    Discharge Medications:  New Prescriptions    TOBRAMYCIN (TOBREX) 0.3 % OPHTHALMIC SOLUTION    Apply 1 drop to eye every 4 hours for 7 days       Scribe  Disclosure:  I, Jann Stevie, am serving as a scribe at 2:44 PM on 6/28/2018 to document services personally performed by Michael Beard MD based on my observations and the provider's statements to me.     6/28/2018    EMERGENCY DEPARTMENT       Michael Beard MD  06/29/18 0620

## 2018-06-28 NOTE — ED AVS SNAPSHOT
Emergency Department    [address] [place] [address]    Phone:  [phone]    Fax:  [phone]                                       Ernesto Valencia   MRN: [id number]    Department:   Emergency Department   Date of Visit:  6/28/2018           After Visit Summary Signature Page     I have received my discharge instructions, and my questions have been answered. I have discussed any challenges I see with this plan with the nurse or doctor.    ..........................................................................................................................................  Patient/Patient Representative Signature      ..........................................................................................................................................  Patient Representative Print Name and Relationship to Patient    ..................................................               ................................................  Date                                            Time    ..........................................................................................................................................  Reviewed by Signature/Title    ...................................................              ..............................................  Date                                                            Time

## 2018-06-28 NOTE — DISCHARGE INSTRUCTIONS

## 2018-11-16 ENCOUNTER — HOSPITAL ENCOUNTER (EMERGENCY)
Facility: CLINIC | Age: 28
Discharge: HOME | End: 2018-11-16
Attending: EMERGENCY MEDICINE | Admitting: EMERGENCY MEDICINE
Payer: COMMERCIAL

## 2018-11-16 ENCOUNTER — APPOINTMENT (OUTPATIENT)
Dept: GENERAL RADIOLOGY | Facility: CLINIC | Age: 28
End: 2018-11-16
Attending: EMERGENCY MEDICINE
Payer: COMMERCIAL

## 2018-11-16 VITALS
BODY MASS INDEX: 33.34 KG/M2 | TEMPERATURE: 98.4 F | HEIGHT: 68 IN | OXYGEN SATURATION: 100 % | SYSTOLIC BLOOD PRESSURE: 130 MMHG | DIASTOLIC BLOOD PRESSURE: 67 MMHG | RESPIRATION RATE: 14 BRPM | WEIGHT: 220 LBS | HEART RATE: 83 BPM

## 2018-11-16 DIAGNOSIS — S93.505A: ICD-10-CM

## 2018-11-16 PROCEDURE — 99283 EMERGENCY DEPT VISIT LOW MDM: CPT

## 2018-11-16 PROCEDURE — 73630 X-RAY EXAM OF FOOT: CPT | Mod: RT

## 2018-11-16 PROCEDURE — 25000132 ZZH RX MED GY IP 250 OP 250 PS 637: Performed by: EMERGENCY MEDICINE

## 2018-11-16 RX ORDER — IBUPROFEN 600 MG/1
600 TABLET, FILM COATED ORAL ONCE
Status: COMPLETED | OUTPATIENT
Start: 2018-11-16 | End: 2018-11-16

## 2018-11-16 RX ORDER — IBUPROFEN 600 MG/1
600 TABLET, FILM COATED ORAL EVERY 8 HOURS PRN
Qty: 30 TABLET | Refills: 0 | Status: SHIPPED | OUTPATIENT
Start: 2018-11-16

## 2018-11-16 RX ADMIN — IBUPROFEN 600 MG: 600 TABLET, FILM COATED ORAL at 14:48

## 2018-11-16 ASSESSMENT — ENCOUNTER SYMPTOMS
NUMBNESS: 0
MYALGIAS: 0
WEAKNESS: 0
WOUND: 0
ARTHRALGIAS: 0
FEVER: 0
CHILLS: 0
JOINT SWELLING: 0

## 2018-11-16 NOTE — ED NOTES
Patient not in room for discharge instructions.  Instructions and prescription mailed to patient.

## 2018-11-16 NOTE — ED AVS SNAPSHOT
Emergency Department    [address] [facility] [address]    Phone:  [phone]    Fax:  [phone]                                       Ernesto Valencia   MRN: [id number]    Department:   Emergency Department   Date of Visit:  11/16/2018           Patient Information     Date Of Birth          1990        Your diagnoses for this visit were:     Sprain of toe, fourth, left, initial encounter        You were seen by Bobby Toro MD.      Follow-up Information     Follow up with Clinic, [facility]. Schedule an appointment as soon as possible for a visit in 3 days.    Why:  As needed, for follow up    Contact information:    [address] [facility] [id number] [phone]          Discharge Instructions         Toe Sprain  A sprain is a stretching or tearing of the ligaments that hold a joint together. There are no broken bones. Sprains generally take from 3-6 weeks to heal. A toe sprain may be treated by taping the injured toe to the next toe. This is called buddy taping. This protects the injured toe and holds it in position. Mild sprains may not need any additional support. If the toenail has been hurt badly, it may fall off in 1-2 weeks. A new one will usually start to grow back within a month.     Home care    Keep your leg elevated above heart level when sitting or lying down. This is very important during the first 48 hours to reduce swelling. Stay off the injured foot as much as possible until you can walk on it without pain. If needed, you may use crutches during the first week for this purpose. Crutches can be rented at many pharmacies or surgical/orthopedic supply stores.    You may be given a cast shoe to wear to prevent movement in your toe. If not, you can use a sandal or any shoe that does not put pressure on the injured toe until the swelling and pain go away. If using a sandal, be careful not to hit your foot against anything, since another injury could  make the sprain worse.    Apply an ice pack over the injured area for 15 to 20 minutes every 3 to 6 hours. You should do this for the first 24 to 48 hours. You can make an ice pack by filling a plastic bag that seals at the top with ice cubes and then wrapping it with a thin towel. Continue to use ice packs for relief of pain and swelling as needed. As the ice melts, be careful to avoid getting your wrap, splint, or cast wet. As the ice melts, be careful to avoid getting any wrap, splint, tape, or cast wet. After 48 hours, apply heat from a warm shower or bath for 20 minutes several times daily. Alternating ice and heat may also be helpful.    If buddy tape was applied and it becomes wet or dirty, change it. You may replace it with paper, plastic or cloth tape. Cloth tape and paper tapes must be kept dry. Apply gauze or cotton padding between the toes, especially near webbed area. This will help prevent the skin from getting moist and breaking down. Keep the buddy tape in place for at least 4 weeks, or as advised by your healthcare provider.    You may use over-the-counter pain medicine to control pain, unless another pain medicine was prescribed. If you have chronic liver or kidney disease or ever had a stomach ulcer or gastrointestinal bleeding, talk with your healthcare provider before using these medicines.    You may return to sports after healing, when you can run without pain.  Follow-up care  Follow up with your with your healthcare provider as advised. Sometimes fractures don t show up on the first X-ray. Bruises and sprains can sometimes hurt as much as a fracture. These injuries can take time to heal completely. If your symptoms don t improve or they get worse, talk with your healthcare provider. You may need a repeat X-ray. If X-rays were taken, you will be told of any new findings that may affect your care.     When to seek medical advice  Call your healthcare provider right away if any of these  occur:    Redness, warmth, or fluid drainage from your toe    Pain or swelling increases    Toes become cold, blue, numb, or tingly   Date Last Reviewed: 5/1/2018 2000-2018 [employer]. [address] [place] [address], [place] [address]. All rights reserved. This information is not intended as a substitute for professional medical care. Always follow your healthcare professional's instructions.          24 Hour Appointment Hotline       To make an appointment at any [facility], call [phone]). If you don't have a family doctor or clinic, we will help you find one. [facility] clinics are conveniently located to serve the needs of you and your family.             Review of your medicines      START taking        Dose / Directions Last dose taken    ibuprofen 600 MG tablet   Commonly known as:  ADVIL/MOTRIN   Dose:  600 mg   Quantity:  30 tablet        Take 1 tablet (600 mg) by mouth every 8 hours as needed for moderate pain   Refills:  0                Prescriptions were sent or printed at these locations (1 Prescription)                   Other Prescriptions                Printed at Department/Unit printer (1 of 1)         ibuprofen (ADVIL/MOTRIN) 600 MG tablet                Procedures and tests performed during your visit     XR Foot Right 3 Views      Orders Needing Specimen Collection     None      Pending Results     No orders found from 11/14/2018 to 11/17/2018.            Pending Culture Results     No orders found from 11/14/2018 to 11/17/2018.            Pending Results Instructions     If you had any lab results that were not finalized at the time of your Discharge, you can call the ED Lab Result RN at [phone]. You will be contacted by this team for any positive Lab results or changes in treatment. The nurses are available 7 days a week from 10A to 6:30P.  You can leave a message 24 hours per day and they will return your call.        Test Results From Your Hospital  Stay        11/16/2018  3:48 PM      Narrative     RIGHT FOOT 3 VIEWS  11/16/2018 3:14 PM    HISTORY:  Right fourth toe pain after injury.    COMPARISON:  None.    FINDINGS:  No fracture or osseous lesion is seen. The joint spaces are  well preserved. No soft tissue pathology is seen.        Impression     IMPRESSION:  Unremarkable examination.    ROBERT GORDON MD                Clinical Quality Measure: Blood Pressure Screening     Your blood pressure was checked while you were in the emergency department today. The last reading we obtained was  BP: 130/67 . Please read the guidelines below about what these numbers mean and what you should do about them.  If your systolic blood pressure (the top number) is less than 120 and your diastolic blood pressure (the bottom number) is less than 80, then your blood pressure is normal. There is nothing more that you need to do about it.  If your systolic blood pressure (the top number) is 120-139 or your diastolic blood pressure (the bottom number) is 80-89, your blood pressure may be higher than it should be. You should have your blood pressure rechecked within a year by a primary care provider.  If your systolic blood pressure (the top number) is 140 or greater or your diastolic blood pressure (the bottom number) is 90 or greater, you may have high blood pressure. High blood pressure is treatable, but if left untreated over time it can put you at risk for heart attack, stroke, or kidney failure. You should have your blood pressure rechecked by a primary care provider within the next 4 weeks.  If your provider in the emergency department today gave you specific instructions to follow-up with your doctor or provider even sooner than that, you should follow that instruction and not wait for up to 4 weeks for your follow-up visit.        Thank you for choosing [facility]       Thank you for choosing [facility] for your care. Our goal is always to provide you with excellent  "care. Hearing back from our patients is one way we can continue to improve our services. Please take a few minutes to complete the written survey that you may receive in the mail after you visit with us. Thank you!        [employer] Information     [employer] lets you send messages to your doctor, view your test results, renew your prescriptions, schedule appointments and more. To sign up, go to [email or web address] . Click on \"Log in\" on the left side of the screen, which will take you to the Welcome page. Then click on \"Sign up Now\" on the right side of the page.     You will be asked to enter the access code listed below, as well as some personal information. Please follow the directions to create your username and password.     Your access code is: [email or web address]  Expires: 2019  4:15 PM     Your access code will  in 90 days. If you need help or a new code, please call your [facility] clinic or [phone].        Care EveryWhere ID     This is your Care EveryWhere ID. This could be used by other organizations to access your [facility] medical records  [id number]        Equal Access to Services     RADHA GRAYSON AH: Hadii radha Kam, walevida burak, qaybta kaalmada monse, sofy barreto. So [facility] [phone].    ATENCIÓN: Si habla español, tiene a chahal disposición servicios gratuitos de asistencia lingüística. Anmol al [phone].    We comply with applicable federal civil rights laws and [place] laws. We do not discriminate on the basis of race, color, national origin, age, disability, sex, sexual orientation, or gender identity.            After Visit Summary       This is your record. Keep this with you and show to your community pharmacist(s) and doctor(s) at your next visit.                  "

## 2018-11-16 NOTE — ED AVS SNAPSHOT
Emergency Department    [address] [facility] [address]    Phone:  [phone]    Fax:  [phone]                                       Ernesto Valencia   MRN: [id number]    Department:   Emergency Department   Date of Visit:  11/16/2018           After Visit Summary Signature Page     I have received my discharge instructions, and my questions have been answered. I have discussed any challenges I see with this plan with the nurse or doctor.    ..........................................................................................................................................  Patient/Patient Representative Signature      ..........................................................................................................................................  Patient Representative Print Name and Relationship to Patient    ..................................................               ................................................  Date                                   Time    ..........................................................................................................................................  Reviewed by Signature/Title    ...................................................              ..............................................  Date                                               Time          22EPIC Rev 08/18

## 2018-11-16 NOTE — DISCHARGE INSTRUCTIONS
Toe Sprain  A sprain is a stretching or tearing of the ligaments that hold a joint together. There are no broken bones. Sprains generally take from 3-6 weeks to heal. A toe sprain may be treated by taping the injured toe to the next toe. This is called buddy taping. This protects the injured toe and holds it in position. Mild sprains may not need any additional support. If the toenail has been hurt badly, it may fall off in 1-2 weeks. A new one will usually start to grow back within a month.     Home care    Keep your leg elevated above heart level when sitting or lying down. This is very important during the first 48 hours to reduce swelling. Stay off the injured foot as much as possible until you can walk on it without pain. If needed, you may use crutches during the first week for this purpose. Crutches can be rented at many pharmacies or surgical/orthopedic supply stores.    You may be given a cast shoe to wear to prevent movement in your toe. If not, you can use a sandal or any shoe that does not put pressure on the injured toe until the swelling and pain go away. If using a sandal, be careful not to hit your foot against anything, since another injury could make the sprain worse.    Apply an ice pack over the injured area for 15 to 20 minutes every 3 to 6 hours. You should do this for the first 24 to 48 hours. You can make an ice pack by filling a plastic bag that seals at the top with ice cubes and then wrapping it with a thin towel. Continue to use ice packs for relief of pain and swelling as needed. As the ice melts, be careful to avoid getting your wrap, splint, or cast wet. As the ice melts, be careful to avoid getting any wrap, splint, tape, or cast wet. After 48 hours, apply heat from a warm shower or bath for 20 minutes several times daily. Alternating ice and heat may also be helpful.    If buddy tape was applied and it becomes wet or dirty, change it. You may replace it with paper, plastic or cloth  tape. Cloth tape and paper tapes must be kept dry. Apply gauze or cotton padding between the toes, especially near webbed area. This will help prevent the skin from getting moist and breaking down. Keep the markel tape in place for at least 4 weeks, or as advised by your healthcare provider.    You may use over-the-counter pain medicine to control pain, unless another pain medicine was prescribed. If you have chronic liver or kidney disease or ever had a stomach ulcer or gastrointestinal bleeding, talk with your healthcare provider before using these medicines.    You may return to sports after healing, when you can run without pain.  Follow-up care  Follow up with your with your healthcare provider as advised. Sometimes fractures don t show up on the first X-ray. Bruises and sprains can sometimes hurt as much as a fracture. These injuries can take time to heal completely. If your symptoms don t improve or they get worse, talk with your healthcare provider. You may need a repeat X-ray. If X-rays were taken, you will be told of any new findings that may affect your care.     When to seek medical advice  Call your healthcare provider right away if any of these occur:    Redness, warmth, or fluid drainage from your toe    Pain or swelling increases    Toes become cold, blue, numb, or tingly   Date Last Reviewed: 5/1/2018 2000-2018 [employer]. [address] [place] [address], [place] [address]. All rights reserved. This information is not intended as a substitute for professional medical care. Always follow your healthcare professional's instructions.

## 2018-11-16 NOTE — ED PROVIDER NOTES
"  History     Chief Complaint:  Toe Pain    HPI   Ernesto Valencia is a 28 year old female who presents with pain and swelling of the right fourth toe after stubbing it into a wall 3 days ago.  She reports persistent pain and swelling in this area worse with ambulation touching or movement of the toe which is prompted her visit to the ED today.  She has been ambulate in on it but this increases her pain.  She denies any numbness tingling or weakness and reports that she is able to move it.  She denies any pain in the midfoot, forefoot, ankle or lower leg.  She denies any history of diabetes or peripheral vascular disease.  She denies any other symptoms at this time.  No other injuries.  Patient has taken 1 or 2 doses of ibuprofen with minimal relief, last dose yesterday.    Allergies:  NKDA     Medications:    Ibuprofen    Past Medical History:    Marfan syndrome  Scoliosis  Asthma    Past Surgical History:    Right eye surgery  Tubal ligation    Family History:    No past pertinent family history.    Social History:  Marital Status:  Single [1]  Negative for tobacco use.  Negative for alcohol use.     Review of Systems   Constitutional: Negative for chills and fever.   Musculoskeletal: Negative for arthralgias, gait problem, joint swelling and myalgias.   Skin: Negative for rash and wound.   Neurological: Negative for weakness and numbness.   All other systems reviewed and are negative.      Physical Exam     Patient Vitals for the past 24 hrs:   BP Temp Temp src Pulse Resp SpO2 Height Weight   11/16/18 1425 130/67 98.4  F (36.9  C) Oral 83 14 100 % 1.727 m (5' 8\") 99.8 kg (220 lb)     Physical Exam  General: Resting comfortably  Head:  Scalp, face, and head appear normal  Eyes:  Pupils equal, round    Conjunctivae noninjected and sclera white  ENT:    The nose is normal    Ears/pinnae are normal  Neck:  Normal range of motion  Resp:  Respirations are even and unlabored  CV:  The right foot is warm and " well-perfused.  DP pulse 2+.  MSK:  Patient with focal tenderness to palpation over the mid right fourth toe.  No external evidence of injury, trauma or wounds.  No significant swelling.  No tenderness to the MTP joint metatarsals proximal foot, ankle or lower leg.  Skin:  No rash or lesions noted.  Neuro: Speech is normal and fluent.  Sensation intact to light touch in the toes.  Patient able to move the toes normally.  Strength at the ankle.    Moves all extremities spontaneously  Psych:  Awake, Alert. Normal affect      Appropriate interactions    Emergency Department Course   Imaging:  Radiographic findings were communicated with the patient who voiced understanding of the findings.    XR Foot 3 views, Right:    Unremarkable examination, as per radiology.     Interventions:  1448 Ibuprofen, 600 mg, PO    Emergency Department Course:  Nursing notes and vitals reviewed. I performed an exam of the patient as documented above.      Medicine administered as documented above.      The patient was sent for a foot x-ray while in the emergency department, findings above.      (2063) I rechecked the patient and discussed the results of her workup thus far.      Findings and plan explained to the Patient. Patient discharged home with instructions regarding supportive care, medications, and reasons to return. The importance of close follow-up was reviewed. The patient was prescribed ibuprofen.      I personally reviewed the imaging results with the Patient and answered all related questions prior to discharge.      Impression & Plan      Medical Decision Making:  Ernesto Valencia is a 28 year old female who presents for evaluation of toe pain after hitting it against a wall 3 days ago. CMS is intact distally in the extremity.  Xrays of the foot reveals no fracture. There is no wound and no subungual hematoma. The toe was markel taped for comfort. There is no indication for ortho or podiatry consultation from the ED. Rather,  close follow-up is indicated in the next days as needed and precautions were given for home. Patient instructed to use NSAIDS and ice pack for pain.     Diagnosis:    ICD-10-CM    1. Sprain of toe, fourth, left, initial encounter S93.505A        Disposition:  discharged to home    Discharge Medications:  New Prescriptions    IBUPROFEN (ADVIL/MOTRIN) 600 MG TABLET    Take 1 tablet (600 mg) by mouth every 8 hours as needed for moderate pain     Scribe Disclosure:  I, Diana Santamaria, am serving as a scribe on 11/16/2018 at 2:30 PM to personally document services performed by Bobby Toro MD based on my observations and the provider's statements to me.     Diana Santamaria  11/16/2018    EMERGENCY DEPARTMENT       Bobby Toro MD  11/16/18 1950

## 2021-01-06 ENCOUNTER — E-VISIT (OUTPATIENT)
Dept: URGENT CARE | Facility: URGENT CARE | Age: 31
End: 2021-01-06
Payer: COMMERCIAL

## 2021-01-06 DIAGNOSIS — Z20.822 SUSPECTED COVID-19 VIRUS INFECTION: Primary | ICD-10-CM

## 2021-01-06 PROCEDURE — 99421 OL DIG E/M SVC 5-10 MIN: CPT | Performed by: NURSE PRACTITIONER

## 2021-01-06 NOTE — PATIENT INSTRUCTIONS
Dear Ernesto Valencia,    Your symptoms show that you may have coronavirus (COVID-19). This illness can cause fever, cough and trouble breathing. Many people get a mild case and get better on their own. Some people can get very sick.    Will I be tested for COVID-19?  We would like to test you for Covid-19 virus. I have placed orders for this test.     For all employees or close contacts (except [employer] and [employer] - see below), go to your [employer] home page and scroll down to the section that says  You have an appointment that needs to be scheduled  and click the large green button that says  Schedule Now  and follow the steps to find the next available opening.     If you are unable to complete these steps or if you cannot find any available times, please call [phone] to schedule employee testing.     [employer] employees or close contacts, please call [phone].   [place] ([employer]) employees or close contacts call [phone].    Return to work/school/ guidance:  Please let your workplace manager and staffing office know when your quarantine ends     We can t give you an exact date as it depends on the above. You can calculate this on your own or work with your manager/staffing office to calculate this. (For example if you were exposed on 10/4, you would have to quarantine for 14 full days. That would be through 10/18. You could return on 10/19.)      If you receive a positive COVID-19 test result, follow the guidance of the those who are giving you the results. Usually the return to work is 10 (or in some cases 20 days from symptom onset.) If you work at [employer], you must also be cleared by Employee Occupational Health and Safety to return to work.        If you receive a negative COVID-19 test result and did not have a high risk exposure to someone with a known positive COVID-19 test, you can return to work once you're free of fever for 24 hours without fever-reducing medication and  your symptoms are improving or resolved.      If you receive a negative COVID-19 test and If you had a high risk exposure to someone who has tested positive for COVID-19 then you can return to work 14 days after your last contact with the positive individual    Note: If you have ongoing exposure to the covid positive person, this quarantine period may be more than 14 days. (For example, if you are continued to be exposed to your child who tested positive and cannot isolate from them, then the quarantine of 7-14 days can't start until your child is no longer contagious. This is typically 10 days from onset of the child's symptoms. So the total duration may be 17-24 days in this case.)    Sign up for [employer].   We know it's scary to hear that you might have COVID-19. We want to track your symptoms to make sure you're okay over the next 2 weeks. Please look for an email from [employer]--this is a free, online program that we'll use to keep in touch. To sign up, follow the link in the email you will receive. Learn more at [email or web address]    How can I take care of myself?    Get lots of rest. Drink extra fluids (unless a doctor has told you not to)    Take Tylenol (acetaminophen) or ibuprofen for fever or pain. If you have liver or kidney problems, ask your family doctor if it's okay to take Tylenol o ibuprofen    If you have other health problems (like cancer, heart failure, an organ transplant or severe kidney disease): Call your specialty clinic if you don't feel better in the next 2 days.    Know when to call 911. Emergency warning signs include:  o Trouble breathing or shortness of breath  o Pain or pressure in the chest that doesn't go away  o Feeling confused like you haven't felt before, or not being able to wake up  o Bluish-colored lips or face    Where can I get more information?   [employer] [place] - About COVID-19:   [email or web address]    CDC - What to Do If You're Sick:    www.cdc.gov/coronavirus/2019-ncov/about/steps-when-sick.html

## 2021-01-07 DIAGNOSIS — Z20.822 SUSPECTED COVID-19 VIRUS INFECTION: ICD-10-CM

## 2021-01-07 LAB
SARS-COV-2 RNA RESP QL NAA+PROBE: NOT DETECTED
SPECIMEN SOURCE: NORMAL

## 2021-01-07 PROCEDURE — U0005 INFEC AGEN DETEC AMPLI PROBE: HCPCS | Performed by: NURSE PRACTITIONER

## 2021-01-07 PROCEDURE — U0003 INFECTIOUS AGENT DETECTION BY NUCLEIC ACID (DNA OR RNA); SEVERE ACUTE RESPIRATORY SYNDROME CORONAVIRUS 2 (SARS-COV-2) (CORONAVIRUS DISEASE [COVID-19]), AMPLIFIED PROBE TECHNIQUE, MAKING USE OF HIGH THROUGHPUT TECHNOLOGIES AS DESCRIBED BY CMS-2020-01-R: HCPCS | Performed by: NURSE PRACTITIONER

## 2021-01-15 ENCOUNTER — HEALTH MAINTENANCE LETTER (OUTPATIENT)
Age: 31
End: 2021-01-15

## 2021-06-10 ENCOUNTER — HOSPITAL ENCOUNTER (OUTPATIENT)
Dept: GENERAL RADIOLOGY | Facility: CLINIC | Age: 31
End: 2021-06-10
Attending: INTERNAL MEDICINE

## 2021-06-10 DIAGNOSIS — R76.11 POSITIVE PPD: ICD-10-CM

## 2021-06-10 PROCEDURE — 999N000028 XR CHEST 1 VIEW, EMPLOYEE HEALTH

## 2021-07-14 ENCOUNTER — HOSPITAL ENCOUNTER (EMERGENCY)
Facility: CLINIC | Age: 31
Discharge: HOME | End: 2021-07-14
Attending: PHYSICIAN ASSISTANT | Admitting: PHYSICIAN ASSISTANT
Payer: COMMERCIAL

## 2021-07-14 VITALS
HEIGHT: 68 IN | WEIGHT: 234 LBS | SYSTOLIC BLOOD PRESSURE: 136 MMHG | DIASTOLIC BLOOD PRESSURE: 77 MMHG | HEART RATE: 84 BPM | TEMPERATURE: 98.4 F | RESPIRATION RATE: 16 BRPM | BODY MASS INDEX: 35.46 KG/M2 | OXYGEN SATURATION: 100 %

## 2021-07-14 DIAGNOSIS — M54.6 ACUTE BILATERAL THORACIC BACK PAIN: ICD-10-CM

## 2021-07-14 PROCEDURE — 250N000013 HC RX MED GY IP 250 OP 250 PS 637: Performed by: PHYSICIAN ASSISTANT

## 2021-07-14 PROCEDURE — 99283 EMERGENCY DEPT VISIT LOW MDM: CPT

## 2021-07-14 RX ORDER — IBUPROFEN 600 MG/1
600 TABLET, FILM COATED ORAL ONCE
Status: COMPLETED | OUTPATIENT
Start: 2021-07-14 | End: 2021-07-14

## 2021-07-14 RX ORDER — METHOCARBAMOL 750 MG/1
750 TABLET, FILM COATED ORAL 3 TIMES DAILY PRN
Qty: 15 TABLET | Refills: 0 | Status: SHIPPED | OUTPATIENT
Start: 2021-07-14 | End: 2021-07-19

## 2021-07-14 RX ORDER — LIDOCAINE 50 MG/G
1 PATCH TOPICAL EVERY 24 HOURS
Qty: 10 PATCH | Refills: 0 | Status: SHIPPED | OUTPATIENT
Start: 2021-07-14

## 2021-07-14 RX ORDER — METHOCARBAMOL 750 MG/1
750 TABLET, FILM COATED ORAL ONCE
Status: COMPLETED | OUTPATIENT
Start: 2021-07-14 | End: 2021-07-14

## 2021-07-14 RX ORDER — LIDOCAINE 4 G/G
2 PATCH TOPICAL ONCE
Status: DISCONTINUED | OUTPATIENT
Start: 2021-07-14 | End: 2021-07-14 | Stop reason: HOSPADM

## 2021-07-14 RX ADMIN — METHOCARBAMOL 750 MG: 750 TABLET ORAL at 18:59

## 2021-07-14 RX ADMIN — IBUPROFEN 600 MG: 600 TABLET ORAL at 18:55

## 2021-07-14 RX ADMIN — LIDOCAINE 2 PATCH: 560 PATCH PERCUTANEOUS; TOPICAL; TRANSDERMAL at 18:55

## 2021-07-14 ASSESSMENT — ENCOUNTER SYMPTOMS
DIARRHEA: 0
BACK PAIN: 1
DIFFICULTY URINATING: 0
ARTHRALGIAS: 0
CONSTIPATION: 0
SHORTNESS OF BREATH: 0

## 2021-07-14 ASSESSMENT — MIFFLIN-ST. JEOR: SCORE: 1834.92

## 2021-07-14 NOTE — LETTER
July 14, 2021      To Whom It May Concern:      Ernesto Valencia was seen in our Emergency Department today, 07/14/21.  Please excuse her absence today.      Sincerely,        Jewell Cha RN

## 2021-07-14 NOTE — ED PROVIDER NOTES
"  History   Chief Complaint:  Back Pain       HPI   Ernesto Valencia is a 30 year old female with history of fetal alcohol spectrum disorder, Marfan syndrome, asthma who presents with back pain which began while she was helping a resident at work at [employer]. She states that she was bending over when she felt a sharp pain radiate up her back. She denies other injuries, falls, trauma. She has been taking baths with Dr. Willingham for pain but has not taken anything orally. Here in the ED, she states that she is having pain all over her back and at the back of her ribs. She denies pain down her legs, difficulty breathing, urine or bowel issues. She reports a history of Marfan syndrome. She does not take regular medications.    Review of Systems   Respiratory: Negative for shortness of breath.    Gastrointestinal: Negative for constipation and diarrhea.   Genitourinary: Negative for difficulty urinating.   Musculoskeletal: Positive for back pain. Negative for arthralgias (leg pain).     Allergies:  The patient has no known allergies.     Medications:  The patient is currently on no regular medications.    Past Medical History:    Fetal alcohol spectrum disorder  Marfan syndrome  Scoliosis  Asthma  Depression  Cardiomegaly     Past Surgical History:    Eye surgery, lens implant  Tubal ligation    Social History:  Patient presents to the ED with her daughter.  Patient presents to the ED via car.    Physical Exam     Patient Vitals for the past 24 hrs:   BP Temp Temp src Pulse Resp SpO2 Height Weight   07/14/21 1843 136/77 98.4  F (36.9  C) Oral 84 16 100 % 1.727 m (5' 8\") 106.1 kg (234 lb)       Physical Exam  General: Alert and interactive. Appears well. Cooperative and pleasant.   Eyes: The pupils are equal and round. EOMs intact. No scleral icterus.  ENT: No abnormalities to the external nose or ears. Mucous membranes moist. Posterior oropharynx is non-erythematous.  Neck: Trachea is in the midline. No nuchal rigidity. "     CV: Regular rate and rhythm. S1 and S2 normal without murmur, click, gallop or rub.   Resp: Breath sounds are clear bilaterally, without rhonchi, wheezes, rales. Non-labored, no retractions or accessory muscle use.     GI: Abdomen is soft without distension. No tenderness to palpation. No peritoneal signs.    MS: Moving all extremities well. Good muscle tone. Tenderness along bilateral paraspinal muscles of the thoracic and lumbar spine.   Skin: Warm and dry. No rash or lesions noted.  Neuro: Alert and oriented x 3. No focal neurologic deficits. Good strength and sensation in upper and lower extremities. Psych: Awake. Alert.  Normal affect. Appropriate interactions.  Lymph: No anterior or posterior cervical lymphadenopathy noted.    Emergency Department Course     Emergency Department Course:    Reviewed:  I reviewed nursing notes, vitals and care everywhere    Assessments:  1846 I obtained history and examined the patient as noted above.     1939 I rechecked the patient and explained findings.     Interventions:  1855 Ibuprofen 600 mg PO    1855 Lidocaine 4% patch transdermal    1859 Robaxin 750 mg PO    Disposition:  The patient was discharged to home.       Impression & Plan     CMS Diagnoses: None    Medical Decision Making:  Ernesto Valencia is a 30 year old female who presents with thoracic back pain after heavy lifting at her workplace today.  She has no lower back pain, sciatica, numbness to her lower extremities, saddle anesthesia, fevers, chills, incontinence of bowel or bladder, or other red flag symptoms. No falls or trauma to suggest need for x-ray or CT scan to rule out fracture. No chest pain, shortness of breath, abdominal pain, or abdominal tenderness on examination to suggest referred pain from intraabdominal source, such as appendicitis, PE, SBO, AAA. She feels better here after ibuprofen, Robaxin, and lidocaine patches. I suggested that she begin Robaxin and lidocaine patches at home. Return  for fevers, chills, weakness to the upper or lower extremities, incontinence of bowel or bladder, or other worrisome concerns.    Diagnosis:    ICD-10-CM    1. Acute bilateral thoracic back pain  M54.6        Discharge Medications:  Discharge Medication List as of 7/14/2021  7:41 PM      START taking these medications    Details   lidocaine (LIDODERM) 5 % patch Place 1 patch onto the skin every 24 hours To prevent lidocaine toxicity, patient should be patch free for 12 hrs daily.Disp-10 patch, R-0Local Print      methocarbamol (ROBAXIN) 750 MG tablet Take 1 tablet (750 mg) by mouth 3 times daily as needed for muscle spasms, Disp-15 tablet, R-0, Local Print             Scribe Disclosure:  I, Leonila Zepeda, am serving as a scribe at 6:46 PM on 7/14/2021 to document services personally performed by Fernanda Moore PA based on my observations and the provider's statements to me.        Fernanda Moore PA-C  07/14/21 2131

## 2021-09-19 ENCOUNTER — HEALTH MAINTENANCE LETTER (OUTPATIENT)
Age: 31
End: 2021-09-19

## 2021-12-23 ENCOUNTER — LAB REQUISITION (OUTPATIENT)
Dept: LAB | Facility: CLINIC | Age: 31
End: 2021-12-23

## 2021-12-23 PROCEDURE — U0003 INFECTIOUS AGENT DETECTION BY NUCLEIC ACID (DNA OR RNA); SEVERE ACUTE RESPIRATORY SYNDROME CORONAVIRUS 2 (SARS-COV-2) (CORONAVIRUS DISEASE [COVID-19]), AMPLIFIED PROBE TECHNIQUE, MAKING USE OF HIGH THROUGHPUT TECHNOLOGIES AS DESCRIBED BY CMS-2020-01-R: HCPCS | Performed by: INTERNAL MEDICINE

## 2021-12-24 LAB — SARS-COV-2 RNA RESP QL NAA+PROBE: NEGATIVE

## 2022-01-14 ENCOUNTER — LAB REQUISITION (OUTPATIENT)
Dept: LAB | Facility: CLINIC | Age: 32
End: 2022-01-14

## 2022-01-14 LAB — SARS-COV-2 RNA RESP QL NAA+PROBE: NEGATIVE

## 2022-01-14 PROCEDURE — U0005 INFEC AGEN DETEC AMPLI PROBE: HCPCS | Performed by: INTERNAL MEDICINE

## 2022-01-21 ENCOUNTER — LAB REQUISITION (OUTPATIENT)
Dept: LAB | Facility: CLINIC | Age: 32
End: 2022-01-21

## 2022-01-21 PROCEDURE — U0005 INFEC AGEN DETEC AMPLI PROBE: HCPCS | Performed by: INTERNAL MEDICINE

## 2022-01-22 LAB — SARS-COV-2 RNA RESP QL NAA+PROBE: NEGATIVE

## 2022-01-23 PROCEDURE — U0005 INFEC AGEN DETEC AMPLI PROBE: HCPCS | Performed by: INTERNAL MEDICINE

## 2022-01-24 ENCOUNTER — LAB REQUISITION (OUTPATIENT)
Dept: LAB | Facility: CLINIC | Age: 32
End: 2022-01-24

## 2022-01-25 LAB — SARS-COV-2 RNA RESP QL NAA+PROBE: NEGATIVE

## 2022-01-26 ENCOUNTER — LAB REQUISITION (OUTPATIENT)
Dept: LAB | Facility: CLINIC | Age: 32
End: 2022-01-26

## 2022-01-26 PROCEDURE — U0003 INFECTIOUS AGENT DETECTION BY NUCLEIC ACID (DNA OR RNA); SEVERE ACUTE RESPIRATORY SYNDROME CORONAVIRUS 2 (SARS-COV-2) (CORONAVIRUS DISEASE [COVID-19]), AMPLIFIED PROBE TECHNIQUE, MAKING USE OF HIGH THROUGHPUT TECHNOLOGIES AS DESCRIBED BY CMS-2020-01-R: HCPCS | Performed by: INTERNAL MEDICINE

## 2022-01-27 LAB — SARS-COV-2 RNA RESP QL NAA+PROBE: NEGATIVE

## 2022-02-09 ENCOUNTER — LAB REQUISITION (OUTPATIENT)
Dept: LAB | Facility: CLINIC | Age: 32
End: 2022-02-09

## 2022-02-09 PROCEDURE — U0003 INFECTIOUS AGENT DETECTION BY NUCLEIC ACID (DNA OR RNA); SEVERE ACUTE RESPIRATORY SYNDROME CORONAVIRUS 2 (SARS-COV-2) (CORONAVIRUS DISEASE [COVID-19]), AMPLIFIED PROBE TECHNIQUE, MAKING USE OF HIGH THROUGHPUT TECHNOLOGIES AS DESCRIBED BY CMS-2020-01-R: HCPCS | Performed by: INTERNAL MEDICINE

## 2022-02-10 LAB — SARS-COV-2 RNA RESP QL NAA+PROBE: NOT DETECTED

## 2022-03-05 ENCOUNTER — HEALTH MAINTENANCE LETTER (OUTPATIENT)
Age: 32
End: 2022-03-05

## 2022-06-26 ENCOUNTER — HOSPITAL ENCOUNTER (EMERGENCY)
Facility: CLINIC | Age: 32
Discharge: HOME | End: 2022-06-26
Attending: EMERGENCY MEDICINE | Admitting: EMERGENCY MEDICINE
Payer: COMMERCIAL

## 2022-06-26 VITALS
DIASTOLIC BLOOD PRESSURE: 75 MMHG | BODY MASS INDEX: 32.88 KG/M2 | SYSTOLIC BLOOD PRESSURE: 121 MMHG | OXYGEN SATURATION: 100 % | HEART RATE: 84 BPM | HEIGHT: 69 IN | TEMPERATURE: 97.1 F | RESPIRATION RATE: 18 BRPM | WEIGHT: 222 LBS

## 2022-06-26 DIAGNOSIS — B96.89 BACTERIAL VAGINOSIS: ICD-10-CM

## 2022-06-26 DIAGNOSIS — N76.0 BACTERIAL VAGINOSIS: ICD-10-CM

## 2022-06-26 LAB
ALBUMIN UR-MCNC: 20 MG/DL
APPEARANCE UR: CLEAR
BACTERIA #/AREA URNS HPF: ABNORMAL /HPF
BILIRUB UR QL STRIP: NEGATIVE
CLUE CELLS: PRESENT
COLOR UR AUTO: ABNORMAL
GLUCOSE UR STRIP-MCNC: NEGATIVE MG/DL
HCG UR QL: NEGATIVE
HGB UR QL STRIP: ABNORMAL
KETONES UR STRIP-MCNC: NEGATIVE MG/DL
LEUKOCYTE ESTERASE UR QL STRIP: NEGATIVE
MUCOUS THREADS #/AREA URNS LPF: PRESENT /LPF
NITRATE UR QL: NEGATIVE
PH UR STRIP: 5.5 [PH] (ref 5–7)
RBC URINE: 50 /HPF
SP GR UR STRIP: 1.02 (ref 1–1.03)
SQUAMOUS EPITHELIAL: 4 /HPF
TRICHOMONAS, WET PREP: ABNORMAL
UROBILINOGEN UR STRIP-MCNC: NORMAL MG/DL
WBC URINE: 4 /HPF
WBC'S/HIGH POWER FIELD, WET PREP: ABNORMAL
YEAST, WET PREP: ABNORMAL

## 2022-06-26 PROCEDURE — 87210 SMEAR WET MOUNT SALINE/INK: CPT | Performed by: EMERGENCY MEDICINE

## 2022-06-26 PROCEDURE — 81001 URINALYSIS AUTO W/SCOPE: CPT | Performed by: EMERGENCY MEDICINE

## 2022-06-26 PROCEDURE — 81025 URINE PREGNANCY TEST: CPT | Performed by: EMERGENCY MEDICINE

## 2022-06-26 PROCEDURE — 99283 EMERGENCY DEPT VISIT LOW MDM: CPT

## 2022-06-26 RX ORDER — METRONIDAZOLE 500 MG/1
500 TABLET ORAL 2 TIMES DAILY
Qty: 14 TABLET | Refills: 0 | Status: SHIPPED | OUTPATIENT
Start: 2022-06-26 | End: 2022-07-03

## 2022-06-26 ASSESSMENT — ENCOUNTER SYMPTOMS
ABDOMINAL PAIN: 0
DYSURIA: 0

## 2022-06-26 NOTE — ED NOTES
Patient turned on call light. States she has to get to work and wants any Rx sent to her [facility] pharmacy in [place]. MD updated

## 2022-06-26 NOTE — ED PROVIDER NOTES
"  History   Chief Complaint:  Vaginal Itching    The history is provided by the patient.      Ernesto Valencia is a 31 year old female with history of Marfan's syndrome, STI, and bacterial vaginosis, who presents with ongoing vaginal itching and discomfort since onset last night. The patient reports taking a bath containing body wash that she does not typically use, which began to cause irritation, increased vaginal discharge, and itching. Her presentation to the ED was prompted by the above symptoms persisting into this morning. She denies any vaginal bleeding, abdominal pain, or dysuria. She also denies any new sexual partners, risk for STD's, or possible pregnancy. The patient reports having similar symptoms before for which she sought treatment and was diagnosed with bacterial vaginosis.    Review of Systems   Gastrointestinal: Negative for abdominal pain.   Genitourinary: Positive for vaginal discharge. Negative for dysuria and vaginal bleeding.        (+) vaginal itching   All other systems reviewed and are negative.    Allergies:  Seafood    Medications:  Epinephrine  Cetirizine  Fluticasone  Ferrous gluconate  Albuterol  Cyclobenzaprine  Benzonatate  Metoprolol succinate    Past Medical History:     Menorrhagia  Asthma  Allergic rhinitis  Marfan's syndrome  Fetal alcohol spectrum disorder  Scoliosis  Lens subluxation  STI  Scoliosis  Hypertension  Chlamydia  Depression  Mood disorder  Learning disability  Cardiomegaly    Past Surgical History:    Eye surgery   Secondary intraocular implant  Tubal ligation  Vitrectomy anterior with intraocular lens implant, right  PPTL    Social History:  The patient presents to the ED alone.  The patient arrived in a private vehicle.  [facility] [place]    Physical Exam     Patient Vitals for the past 24 hrs:   BP Temp Temp src Pulse Resp SpO2 Height Weight   06/26/22 0823 121/75 97.1  F (36.2  C) Temporal 84 18 100 % 1.753 m (5' 9\") " 100.7 kg (222 lb)     Physical Exam    Eye:  Pupils are equal, round, and reactive.  Extraocular movements intact.    ENT:  No rhinorrhea.  Moist mucus membranes.  Normal tongue and tonsil.    Cardiac:  Regular rate and rhythm.  No murmurs, gallops, or rubs.    Pulmonary:  Clear to auscultation bilaterally.  No wheezes, rales, or rhonchi.    Abdomen:  Positive bowel sounds.  Abdomen is soft and non-distended, without focal tenderness.    Musculoskeletal:  Normal movement of all extremities without evidence for deficit.    Skin:  Warm and dry without rashes.    Neurologic:  Non-focal exam without asymmetric weakness or numbness.     Psychiatric:  Normal affect with appropriate interaction with examiner.    Emergency Department Course     Laboratory:  Labs Ordered and Resulted from Time of ED Arrival to Time of ED Departure   ROUTINE UA WITH MICROSCOPIC REFLEX TO CULTURE - Abnormal       Result Value    Color Urine Light Yellow      Appearance Urine Clear      Glucose Urine Negative      Bilirubin Urine Negative      Ketones Urine Negative      Specific Gravity Urine 1.023      Blood Urine Large (*)     pH Urine 5.5      Protein Albumin Urine 20  (*)     Urobilinogen Urine Normal      Nitrite Urine Negative      Leukocyte Esterase Urine Negative      Bacteria Urine Few (*)     Mucus Urine Present (*)     RBC Urine 50 (*)     WBC Urine 4      Squamous Epithelials Urine 4 (*)    WET PREPARATION - Abnormal    Trichomonas Absent      Yeast Absent      Clue Cells Present (*)     WBCs/high power field None     HCG QUALITATIVE URINE - Normal    hCG Urine Qualitative Negative       Emergency Department Course:    Reviewed:  I reviewed nursing notes, vitals, past medical history and Care Everywhere.    Assessments:  0915 I obtained history and examined the patient as noted above. I believe that they are safe for discharge.    Disposition:  The patient was discharged to home.    Impression & Plan     Medical Decision  Making:  This healthy young woman with a history of BV presents to us for similar symptoms of discharge.  She notes no concerns for STDs.  She denies dysuria and her urine is clean.  Wet prep is positive for clue cells.  She will be placed on Flagyl and will follow up with her gynecologist.  She will otherwise return to us for worsening of condition or other emergent concerns.    Diagnosis:    ICD-10-CM    1. Bacterial vaginosis  N76.0     B96.89      Discharge Medications:  Discharge Medication List as of 6/26/2022 10:25 AM      START taking these medications    Details   metroNIDAZOLE (FLAGYL) 500 MG tablet Take 1 tablet (500 mg) by mouth 2 times daily for 7 days, Disp-14 tablet, R-0, E-PrescribeEat yogurt or cottage cheese daily to prevent diarrhea that can be caused by taking this antibiotic.           Scribe Disclosure:  IKenia, am serving as a scribe at 9:10 AM on 6/26/2022 to document services personally performed by Trierweiler, Chad A, MD based on my observations and the provider's statements to me.     Judie MARTINEZ, am serving as a scribe at 0910 on 6/26/2022 to document services personally performed by Trierweiler, Chad A, MD based on my observations and the provider's statements to me.        Trierweiler, Chad A, MD  06/26/22 4094

## 2022-11-20 ENCOUNTER — HEALTH MAINTENANCE LETTER (OUTPATIENT)
Age: 32
End: 2022-11-20

## 2023-04-15 ENCOUNTER — HEALTH MAINTENANCE LETTER (OUTPATIENT)
Age: 33
End: 2023-04-15

## 2023-04-24 ENCOUNTER — HOSPITAL ENCOUNTER (EMERGENCY)
Facility: CLINIC | Age: 33
Discharge: HOME | End: 2023-04-24
Attending: EMERGENCY MEDICINE | Admitting: EMERGENCY MEDICINE
Payer: COMMERCIAL

## 2023-04-24 VITALS
OXYGEN SATURATION: 99 % | RESPIRATION RATE: 18 BRPM | TEMPERATURE: 97.9 F | DIASTOLIC BLOOD PRESSURE: 73 MMHG | HEART RATE: 93 BPM | SYSTOLIC BLOOD PRESSURE: 132 MMHG

## 2023-04-24 DIAGNOSIS — J02.9 ACUTE PHARYNGITIS, UNSPECIFIED ETIOLOGY: ICD-10-CM

## 2023-04-24 DIAGNOSIS — J30.2 SEASONAL ALLERGIC RHINITIS, UNSPECIFIED TRIGGER: ICD-10-CM

## 2023-04-24 LAB
FLUAV RNA SPEC QL NAA+PROBE: NEGATIVE
FLUBV RNA RESP QL NAA+PROBE: NEGATIVE
GROUP A STREP BY PCR: NOT DETECTED
RSV RNA SPEC NAA+PROBE: NEGATIVE
SARS-COV-2 RNA RESP QL NAA+PROBE: NEGATIVE

## 2023-04-24 PROCEDURE — 99283 EMERGENCY DEPT VISIT LOW MDM: CPT | Mod: CS

## 2023-04-24 PROCEDURE — 87637 SARSCOV2&INF A&B&RSV AMP PRB: CPT | Performed by: EMERGENCY MEDICINE

## 2023-04-24 PROCEDURE — 87651 STREP A DNA AMP PROBE: CPT | Performed by: EMERGENCY MEDICINE

## 2023-04-24 PROCEDURE — C9803 HOPD COVID-19 SPEC COLLECT: HCPCS

## 2023-04-24 ASSESSMENT — ENCOUNTER SYMPTOMS
FEVER: 0
SORE THROAT: 1

## 2023-04-24 ASSESSMENT — ACTIVITIES OF DAILY LIVING (ADL): ADLS_ACUITY_SCORE: 33

## 2023-04-24 NOTE — DISCHARGE INSTRUCTIONS
Due to itching and runny nose we are offering you Claritin to assist in allergic rhinitis.  Use ibuprofen for pain with swallowing.  We have done both strep COVID and flu and RSV testing which is all negative.  This should get better with time.  Thanks for your patience today and we hope you feel better.

## 2023-04-24 NOTE — ED TRIAGE NOTES
Pt reports having a sore throat for the last 2 days.     Triage Assessment     Row Name 04/24/23 1022       Triage Assessment (Adult)    Airway WDL WDL       Respiratory WDL    Respiratory WDL WDL       Skin Circulation/Temperature WDL    Skin Circulation/Temperature WDL WDL       Cardiac WDL    Cardiac WDL WDL       Peripheral/Neurovascular WDL    Peripheral Neurovascular WDL WDL       Cognitive/Neuro/Behavioral WDL    Cognitive/Neuro/Behavioral WDL WDL

## 2023-04-24 NOTE — ED PROVIDER NOTES
History     Chief Complaint:  Pharyngitis       HPI   Ernesto Valencia is a 32 year old female with a history of Marfan syndrome who presents with a sore throat. The patient reports 2 days of sore throat and rhinorrhea. She reports it started with ear and throat itching and she now has some pain with swallowing. She thought it was allergies at first. She has been around young kids. She is vaccinated against Covid.    Independent Historian: Independent        ROS:  Review of Systems   Constitutional: Negative for fever.   HENT: Positive for sore throat.         + ear itching   All other systems reviewed and are negative.      Allergies:  Seafood     Medications:    The patient is currently on no regular medications.    Past Medical History:    Scoliosis   Asthma  Marfan syndrome   Fetal alcohol spectrum disorder     Past Surgical History:     Tubal ligation   Eye surgery   Vitrectomy anterior IOL implant     Social History:  She presents to the ED alone   PCP: [facility]     Physical Exam     Patient Vitals for the past 24 hrs:   BP Temp Temp src Pulse Resp SpO2   04/24/23 1022 132/73 97.9  F (36.6  C) Temporal 93 18 99 %        Physical Exam  Vitals reviewed.   HENT:      Head: Normocephalic.      Right Ear: Tympanic membrane normal.      Left Ear: Tympanic membrane normal.      Mouth/Throat:      Mouth: Mucous membranes are moist.      Pharynx: Posterior oropharyngeal erythema present. No pharyngeal swelling or oropharyngeal exudate.      Tonsils: No tonsillar exudate or tonsillar abscesses.   Eyes:      Conjunctiva/sclera: Conjunctivae normal.   Cardiovascular:      Rate and Rhythm: Normal rate and regular rhythm.   Pulmonary:      Effort: Pulmonary effort is normal.      Breath sounds: No stridor.   Abdominal:      Palpations: Abdomen is soft.   Musculoskeletal:      Cervical back: Normal range of motion.   Neurological:      Mental Status: She is alert.           Emergency Department Course      Laboratory:  Labs Ordered and Resulted from Time of ED Arrival to Time of ED Departure   INFLUENZA A/B, RSV, & SARS-COV2 PCR - Normal       Result Value    Influenza A PCR Negative      Influenza B PCR Negative      RSV PCR Negative      SARS CoV2 PCR Negative     GROUP A STREPTOCOCCUS PCR THROAT SWAB - Normal    Group A strep by PCR Not Detected     RAPID STREP GROUP A SCREEN POCT      Emergency Department Course & Assessments:             Interventions:  Medications   dexamethasone (DECADRON) tablet 10 mg (has no administration in time range)        Independent Interpretation (X-rays, CTs, rhythm strip):  Not applicable      Consultations/Discussion of Management or Tests:  1113 Discussed results with patient     Social Determinants of Health affecting care:  None     Assessments:  1112 I evaluated the patient and obtained history     Disposition:  The patient was discharged to home.     Impression & Plan    Medical Decision Making:  Patient presents with a sore throat has had a slight cough and runny nose.  Suspect viral syndrome hard to rule out allergies due to the season however strep test and viral testing in the emergency room were negative offered reassurance symptomatic management and follow-up with primary care.  No concerns for epiglottitis peritonsillar abscess or other throat emergency.      Diagnosis:    ICD-10-CM    1. Acute pharyngitis, unspecified etiology  J02.9       2. Seasonal allergic rhinitis, unspecified trigger  J30.2            Discharge Medications:  New Prescriptions    LORATADINE-PSEUDOEPHEDRINE (CLARITIN-D 12-HOUR) 5-120 MG 12 HR TABLET    Take 1 tablet by mouth 2 times daily    MAGIC MOUTHWASH SUSPENSION (DIPHENHYDRAMINE, LIDOCAINE, ALUMINUM-MAGNESIUM & SIMETHICONE)    Swish and swallow 10 mLs in mouth every 6 hours as needed for mouth sores          Scribe Disclosure:  Greg MARTINEZ, am serving as a scribe at 11:16 AM on 4/24/2023 to document services personally performed by  Ramesh Kaye MD based on my observations and the provider's statements to me.    4/24/2023   Ramesh Kaye MD Goodman, Brian Samuel, MD  04/29/23 1009

## 2024-06-22 ENCOUNTER — HEALTH MAINTENANCE LETTER (OUTPATIENT)
Age: 34
End: 2024-06-22

## 2024-09-30 ENCOUNTER — HOSPITAL ENCOUNTER (EMERGENCY)
Facility: CLINIC | Age: 34
Discharge: HOME | End: 2024-09-30
Attending: EMERGENCY MEDICINE | Admitting: EMERGENCY MEDICINE
Payer: COMMERCIAL

## 2024-09-30 ENCOUNTER — APPOINTMENT (OUTPATIENT)
Dept: GENERAL RADIOLOGY | Facility: CLINIC | Age: 34
End: 2024-09-30
Attending: EMERGENCY MEDICINE
Payer: COMMERCIAL

## 2024-09-30 VITALS
TEMPERATURE: 98.1 F | BODY MASS INDEX: 32.58 KG/M2 | HEART RATE: 89 BPM | OXYGEN SATURATION: 100 % | SYSTOLIC BLOOD PRESSURE: 125 MMHG | RESPIRATION RATE: 16 BRPM | WEIGHT: 220 LBS | HEIGHT: 69 IN | DIASTOLIC BLOOD PRESSURE: 74 MMHG

## 2024-09-30 DIAGNOSIS — M25.562 ACUTE PAIN OF LEFT KNEE: ICD-10-CM

## 2024-09-30 PROCEDURE — 99284 EMERGENCY DEPT VISIT MOD MDM: CPT | Mod: 25

## 2024-09-30 PROCEDURE — 250N000013 HC RX MED GY IP 250 OP 250 PS 637: Performed by: EMERGENCY MEDICINE

## 2024-09-30 PROCEDURE — 73562 X-RAY EXAM OF KNEE 3: CPT | Mod: LT

## 2024-09-30 PROCEDURE — 29505 APPLICATION LONG LEG SPLINT: CPT | Mod: RT

## 2024-09-30 RX ORDER — IBUPROFEN 600 MG/1
600 TABLET, FILM COATED ORAL ONCE
Status: COMPLETED | OUTPATIENT
Start: 2024-09-30 | End: 2024-09-30

## 2024-09-30 RX ORDER — ACETAMINOPHEN 500 MG
1000 TABLET ORAL ONCE
Status: COMPLETED | OUTPATIENT
Start: 2024-09-30 | End: 2024-09-30

## 2024-09-30 RX ADMIN — IBUPROFEN 600 MG: 600 TABLET ORAL at 02:27

## 2024-09-30 RX ADMIN — ACETAMINOPHEN 1000 MG: 500 TABLET ORAL at 02:27

## 2024-09-30 ASSESSMENT — ACTIVITIES OF DAILY LIVING (ADL): ADLS_ACUITY_SCORE: 35

## 2024-09-30 NOTE — Clinical Note
Ernesto Valencia was seen and treated in our emergency department on 9/30/2024.  She may return to work on 10/02/2024.       If you have any questions or concerns, please don't hesitate to call.      Jessica Valle MD

## 2024-09-30 NOTE — ED TRIAGE NOTES
Pt reports this evening knelt down and went to stand up and heard a crack on the left knee along with severe pain

## 2024-09-30 NOTE — ED PROVIDER NOTES
"  Emergency Department Note      History of Present Illness     Chief Complaint   Knee Pain      HPI   Ernesto Valencia is a 34 year old female who presented to the emergency department with knee pain.  Patient with left knee pain.  She reports bending down onto her knee and heard a popping noise.  Now having pain.  No numbness or tingling.    Independent Historian   None    Review of External Notes   Viewed emergency department visit from August 28 where she was seen for right ankle pain and thought to be strain    Past Medical History     Medical History and Problem List   Fetal alcohol spectrum disorder (H)  Marfan syndrome  Scoliosis  Subluxation of lens  Uncomplicated asthma    Medications   The patient is not currently taking any prescribed medications.    Surgical History   Right lens implant  Eye surgery  Tubal ligation    Physical Exam     Patient Vitals for the past 24 hrs:   BP Temp Temp src Pulse Resp SpO2 Height Weight   09/30/24 0114 125/74 -- -- 89 16 100 % -- --   09/30/24 0112 125/74 98.1  F (36.7  C) Temporal 90 20 100 % 1.753 m (5' 9\") 99.8 kg (220 lb)     Physical Exam  General: Sitting up in bed  Eyes:  The pupils are equal and round    Conjunctivae and sclerae are normal  ENT:    Atraumatic face  Neck:  Normal range of motion  CV:  Regular rate,  regular rhythm     DP/PT pulses 2+ bilaterally  Resp:  Non labored breathing on room air    No tachypnea    No cough heard  MS:  Mild tenderness on left medial joint line, able to move left knee in full ROM. Able to do straight leg raise on left side. No joint swelling noted. Anterior/posterior draw test negative  Skin:  No rash or acute skin lesions noted  Neuro:   Awake, alert.      Speech is normal and fluent.    Face is symmetric.     Moves all extremities equally    SILT on bilateral LE  Psych: Normal affect.  Appropriate interactions.    Diagnostics     Imaging   XR Knee Left 3 Views   Final Result   IMPRESSION: Normal joint spaces and alignment. " No fracture. There is a small to moderate suprapatellar joint effusion. Underlying ligamentous and/or meniscal injury cannot be excluded..        Independent Interpretation   X-ray left knee shows no fracture    ED Course      Medications Administered   Medications   ibuprofen (ADVIL/MOTRIN) tablet 600 mg (600 mg Oral $Given 9/30/24 0227)   acetaminophen (TYLENOL) tablet 1,000 mg (1,000 mg Oral $Given 9/30/24 0227)     ED Course        Additional Documentation  None    Medical Decision Making / Diagnosis     EMORY Valencia is a 34 year old female who presented to the emergency department with knee pain.  Patient had sudden onset of knee pain today after kneeling and heard a pop. x-ray shows no fracture though does have effusion and possibility of meniscus versus ligamentous injury.  Patient requesting knee immobilizer which is reasonable and crutches.  Recommend follow-up with [facility] orthopedics.  Ibuprofen and Tylenol as needed for pain.    Disposition   The patient was discharged.     Diagnosis     ICD-10-CM    1. Acute pain of left knee  M25.562          Scribe Disclosure:  Renan MARTINEZ, am serving as a scribe at 1:32 AM on 9/30/2024 to document services personally performed by Jessica Valle MD based on my observations and the provider's statements to me.        Jessica Valle MD  09/30/24 6712

## 2024-09-30 NOTE — DISCHARGE INSTRUCTIONS
Ice, elevate  Ibuprofen and tylenol for pain  Knee immobilizer and crutches as needed  Follow-up with orthopedics as there may be a meniscus or ligament injury- call to make appointment

## 2024-10-03 ENCOUNTER — NURSE TRIAGE (OUTPATIENT)
Dept: INTERNAL MEDICINE | Facility: CLINIC | Age: 34
End: 2024-10-03
Payer: COMMERCIAL

## 2024-10-03 NOTE — TELEPHONE ENCOUNTER
9-30 ED visit for left knee pain with falling to her knees and heard popping sound. Swelling was off and on since injury. 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 Today wore new ACE brace with metal sides and thinks it may have been too tight. Walking normally now. Will use ice and elevation for tonight. Will call primary care clinic tomorrow if still concerned. Soft ACE to knee. Follow up with Orthopedic team as scheduled 10-8. No further questions or concerns per patient.     Sophia Luque RN    Reason for Disposition   Followed a knee injury   Minor knee injury    Additional Information   Negative: Sounds like a life-threatening emergency to the triager   Negative: Chest pain   Negative: Followed an insect bite and has localized swelling (e.g., small area of puffy or swollen skin)   Negative: Ankle or foot injury   Negative: Pregnant with leg swelling or edema   Negative: Major bleeding (actively dripping or spurting) that can't be stopped   Negative: Bullet, stabbed by knife or other serious penetrating wound   Negative: Looks like a dislocated joint (crooked or deformed)   Negative: Serious injury with multiple fractures (broken bones)   Negative: Sounds like a life-threatening emergency to the triager   Negative: Wound looks infected   Negative: Knee pain from overuse (e.g., sports, running, physical work)   Negative: Knee pain not from an injury   Negative: Can't stand (bear weight) or walk   Negative: Skin is split open or gaping (length > 1/2 inch or 12 mm)   Negative: Bleeding won't stop after 10 minutes of direct pressure (using correct technique)   Negative: Dirt in the wound and not removed after 15 minutes of scrubbing   Negative: Sounds like a serious injury to the triager   Negative: Looks infected (e.g., spreading redness, red streak, pus)   Negative: SEVERE pain (e.g., excruciating)   Negative: No prior tetanus shots (or is not fully vaccinated) and any wound (e.g., cut or scrape)   Negative: HIV positive or  "severe immunodeficiency (severely weak immune system) and DIRTY cut or scrape   Negative: Patient wants to be seen   Negative: Injury interferes with work or school   Negative: A 'snap' or 'pop' was heard at the time of injury   Negative: Large swelling or bruise and size > palm of person's hand   Negative: MODERATE pain (e.g., interferes with normal activities, limping) and high-risk adult (e.g., age > 60 years, osteoporosis, chronic steroid use)   Negative: Suspicious history for the injury   Negative: Last tetanus shot > 5 years ago and DIRTY cut or scrape   Negative: Last tetanus shot > 10 years ago and CLEAN cut or scrape   Negative: Injury and pain has not improved after 3 days   Negative: Injury is still painful or swollen after 2 weeks   Negative: Knee giving way (or buckling) when walking   Negative: Knee feels like it is locking (i.e., joint gets stuck, catching)    Answer Assessment - Initial Assessment Questions  1. ONSET: \"When did the swelling start?\" (e.g., minutes, hours, days)      Comes and goes ED   2. LOCATION: \"What part of the leg is swollen?\"  \"Are both legs swollen or just one leg?\"      Left leg \"thigh to knee to calf but no foot swelling  3. SEVERITY: \"How bad is the swelling?\" (e.g., localized; mild, moderate, severe)    - Localized: Small area of swelling localized to one leg.    - MILD pedal edema: Swelling limited to foot and ankle, pitting edema < 1/4 inch (6 mm) deep, rest and elevation eliminate most or all swelling.    - MODERATE edema: Swelling of lower leg to knee, pitting edema > 1/4 inch (6 mm) deep, rest and elevation only partially reduce swelling.    - SEVERE edema: Swelling extends above knee, facial or hand swelling present.        thinks about 10-15% bigger than the right leg. Moderate given description  4. REDNESS: \"Does the swelling look red or infected?\"      no  5. PAIN: \"Is the swelling painful to touch?\" If Yes, ask: \"How painful is it?\"   (Scale 1-10; mild, " "moderate or severe)      no  6. FEVER: \"Do you have a fever?\" If Yes, ask: \"What is it, how was it measured, and when did it start?\"       no  7. CAUSE: \"What do you think is causing the leg swelling?\"      Previous popping injury after fall 9-30. Seen in ED 9/30. 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 MEDICAL HISTORY: \"Do you have a history of blood clots (e.g., DVT), cancer, heart failure, kidney disease, or liver failure?\"      no  9. RECURRENT SYMPTOM: \"Have you had leg swelling before?\" If Yes, ask: \"When was the last time?\" \"What happened that time?\"      no  10. OTHER SYMPTOMS: \"Do you have any other symptoms?\" (e.g., chest pain, difficulty breathing)        no  11. PREGNANCY: \"Is there any chance you are pregnant?\" \"When was your last menstrual period?\"        LMP 9-    Protocols used: Leg Swelling and Edema-A-OH, Knee Injury-A-OH    "

## 2024-10-18 ENCOUNTER — DOCUMENTATION ONLY (OUTPATIENT)
Dept: EMERGENCY MEDICINE | Facility: CLINIC | Age: 34
End: 2024-10-18
Payer: COMMERCIAL

## 2024-10-18 DIAGNOSIS — S83.204A OTH TEAR OF UNSP MENISCUS, CURRENT INJURY, LEFT KNEE, INIT: Primary | ICD-10-CM

## 2025-07-12 ENCOUNTER — HEALTH MAINTENANCE LETTER (OUTPATIENT)
Age: 35
End: 2025-07-12

## (undated) DEVICE — Device

## (undated) DEVICE — GLOVE PROTEXIS MICRO 8.0  2D73PM80

## (undated) DEVICE — GLOVE PROTEXIS MICRO 8.5  2D73PM85

## (undated) DEVICE — SU ETHILON 10-0 CS160-6 12" 9000G

## (undated) DEVICE — EYE SHIELD PLASTIC

## (undated) DEVICE — EYE SPONGE SPEAR WECK CEL 0008685

## (undated) DEVICE — GLOVE PROTEXIS MICRO 7.0  2D73PM70

## (undated) DEVICE — EYE TIP IRRIGATION & ASPIRATION POLYMER 35D BENT 8065751511

## (undated) DEVICE — EYE PACK CUSTOM ANTERIOR 30DEG TIP CENTURION PPK6682-04

## (undated) DEVICE — EYE HANDPIECE 23GA VITRECTOMY CENTURION 8065752134

## (undated) DEVICE — EYE SOL BSS 500ML

## (undated) DEVICE — LINEN TOWEL PACK X5 5464

## (undated) DEVICE — PACK CATARACT CUSTOM SO DALE SEY32CTFCX

## (undated) DEVICE — EYE LENS GLIDE 581033

## (undated) RX ORDER — FENTANYL CITRATE 50 UG/ML
INJECTION, SOLUTION INTRAMUSCULAR; INTRAVENOUS
Status: DISPENSED
Start: 2017-12-08

## (undated) RX ORDER — TETRACAINE HYDROCHLORIDE 5 MG/ML
SOLUTION OPHTHALMIC
Status: DISPENSED
Start: 2017-12-08

## (undated) RX ORDER — LIDOCAINE HYDROCHLORIDE 10 MG/ML
INJECTION, SOLUTION EPIDURAL; INFILTRATION; INTRACAUDAL; PERINEURAL
Status: DISPENSED
Start: 2017-12-08

## (undated) RX ORDER — PROPOFOL 10 MG/ML
INJECTION, EMULSION INTRAVENOUS
Status: DISPENSED
Start: 2017-12-08

## (undated) RX ORDER — TRIAMCINOLONE ACETONIDE 40 MG/ML
INJECTION, SUSPENSION INTRA-ARTICULAR; INTRAMUSCULAR
Status: DISPENSED
Start: 2017-12-08